# Patient Record
Sex: MALE | Race: OTHER | Employment: UNEMPLOYED | URBAN - METROPOLITAN AREA
[De-identification: names, ages, dates, MRNs, and addresses within clinical notes are randomized per-mention and may not be internally consistent; named-entity substitution may affect disease eponyms.]

---

## 2020-03-16 ENCOUNTER — ANESTHESIA EVENT (OUTPATIENT)
Dept: ENDOSCOPY | Age: 65
End: 2020-03-16
Payer: MEDICARE

## 2020-03-16 RX ORDER — SODIUM CHLORIDE 0.9 % (FLUSH) 0.9 %
5-40 SYRINGE (ML) INJECTION EVERY 8 HOURS
Status: CANCELLED | OUTPATIENT
Start: 2020-03-16

## 2020-03-16 RX ORDER — SODIUM CHLORIDE, SODIUM LACTATE, POTASSIUM CHLORIDE, CALCIUM CHLORIDE 600; 310; 30; 20 MG/100ML; MG/100ML; MG/100ML; MG/100ML
100 INJECTION, SOLUTION INTRAVENOUS CONTINUOUS
Status: CANCELLED | OUTPATIENT
Start: 2020-03-16

## 2020-03-16 RX ORDER — SODIUM CHLORIDE 0.9 % (FLUSH) 0.9 %
5-40 SYRINGE (ML) INJECTION AS NEEDED
Status: CANCELLED | OUTPATIENT
Start: 2020-03-16

## 2020-03-17 ENCOUNTER — HOSPITAL ENCOUNTER (OUTPATIENT)
Age: 65
Setting detail: OUTPATIENT SURGERY
Discharge: HOME | End: 2020-03-17
Attending: INTERNAL MEDICINE | Admitting: INTERNAL MEDICINE
Payer: MEDICARE

## 2020-03-17 ENCOUNTER — ANESTHESIA (OUTPATIENT)
Dept: ENDOSCOPY | Age: 65
End: 2020-03-17
Payer: MEDICARE

## 2020-03-17 VITALS
HEART RATE: 66 BPM | DIASTOLIC BLOOD PRESSURE: 75 MMHG | OXYGEN SATURATION: 98 % | SYSTOLIC BLOOD PRESSURE: 148 MMHG | TEMPERATURE: 98.1 F | RESPIRATION RATE: 16 BRPM

## 2020-03-17 PROCEDURE — 76060000031 HC ANESTHESIA FIRST 0.5 HR: Performed by: INTERNAL MEDICINE

## 2020-03-17 PROCEDURE — 74011250636 HC RX REV CODE- 250/636: Performed by: ANESTHESIOLOGY

## 2020-03-17 PROCEDURE — 74011250636 HC RX REV CODE- 250/636: Performed by: REGISTERED NURSE

## 2020-03-17 PROCEDURE — 76040000025: Performed by: INTERNAL MEDICINE

## 2020-03-17 RX ORDER — PROPOFOL 10 MG/ML
INJECTION, EMULSION INTRAVENOUS AS NEEDED
Status: DISCONTINUED | OUTPATIENT
Start: 2020-03-17 | End: 2020-03-17 | Stop reason: HOSPADM

## 2020-03-17 RX ORDER — SODIUM CHLORIDE, SODIUM LACTATE, POTASSIUM CHLORIDE, CALCIUM CHLORIDE 600; 310; 30; 20 MG/100ML; MG/100ML; MG/100ML; MG/100ML
INJECTION, SOLUTION INTRAVENOUS
Status: DISCONTINUED | OUTPATIENT
Start: 2020-03-17 | End: 2020-03-17 | Stop reason: HOSPADM

## 2020-03-17 RX ORDER — SODIUM CHLORIDE, SODIUM LACTATE, POTASSIUM CHLORIDE, CALCIUM CHLORIDE 600; 310; 30; 20 MG/100ML; MG/100ML; MG/100ML; MG/100ML
1000 INJECTION, SOLUTION INTRAVENOUS CONTINUOUS
Status: DISCONTINUED | OUTPATIENT
Start: 2020-03-17 | End: 2020-03-17 | Stop reason: HOSPADM

## 2020-03-17 RX ORDER — PROPOFOL 10 MG/ML
INJECTION, EMULSION INTRAVENOUS
Status: DISCONTINUED | OUTPATIENT
Start: 2020-03-17 | End: 2020-03-17 | Stop reason: HOSPADM

## 2020-03-17 RX ADMIN — PROPOFOL 120 MCG/KG/MIN: 10 INJECTION, EMULSION INTRAVENOUS at 12:05

## 2020-03-17 RX ADMIN — SODIUM CHLORIDE, SODIUM LACTATE, POTASSIUM CHLORIDE, AND CALCIUM CHLORIDE: 600; 310; 30; 20 INJECTION, SOLUTION INTRAVENOUS at 11:56

## 2020-03-17 RX ADMIN — SODIUM CHLORIDE, SODIUM LACTATE, POTASSIUM CHLORIDE, AND CALCIUM CHLORIDE 1000 ML: 600; 310; 30; 20 INJECTION, SOLUTION INTRAVENOUS at 11:12

## 2020-03-17 RX ADMIN — PROPOFOL 80 MG: 10 INJECTION, EMULSION INTRAVENOUS at 12:05

## 2020-03-17 NOTE — PROGRESS NOTES
present to cover any requests in Endoscopy. Thank you for this referral,      [place] , [facility]  /Patient [address]  [address] [facility], [facility]    [phone]

## 2020-03-17 NOTE — ANESTHESIA POSTPROCEDURE EVALUATION
Procedure(s):  COLONOSCOPY/ 29 .    total IV anesthesia    Anesthesia Post Evaluation      Multimodal analgesia: multimodal analgesia used between 6 hours prior to anesthesia start to PACU discharge  Patient location during evaluation: bedside  Patient participation: complete - patient participated  Level of consciousness: awake and alert  Pain score: 3  Pain management: adequate  Airway patency: patent  Anesthetic complications: no  Cardiovascular status: acceptable and hemodynamically stable  Respiratory status: acceptable  Hydration status: acceptable  Post anesthesia nausea and vomiting:  none      Vitals Value Taken Time   /75 3/17/2020 12:57 PM   Temp     Pulse 66 3/17/2020 12:56 PM   Resp 16 3/17/2020 12:56 PM   SpO2 98 % 3/17/2020 12:56 PM   Vitals shown include unvalidated device data.

## 2020-03-17 NOTE — ROUTINE PROCESS
Late entry  
 
 the pt was discharged via wheelchair by staff to pt's son's car driven by the son Irene Eugene, safety was maintained at all times, written and verbal discharge instructions were reviewed by MANAV RN with the pt and his son, written instructions taken by the pt's son. MD was at bedside to review findings with the pt and his son.

## 2020-03-17 NOTE — DISCHARGE INSTRUCTIONS
Gastrointestinal Colonoscopy/Flexible Sigmoidoscopy - Lower Exam Discharge Instructions  1. Call Dr. Pam Goetz at [phone] for any problems or questions. 2. Contact the doctors office for follow up appointment as directed  3. Medication may cause drowsiness for several hours, therefore, do not drive or operate machinery for remainder of the day. 4. Do not make any legal decisions today. 5. No alcohol today. 6. Ordinarily, you may resume regular diet and activity after exam unless otherwise specified by your physician. 7. Because of air put into your colon during exam, you may experience some abdominal distension, relieved by the passage of gas, for several hours. 8. Contact your physician if you have any of the following:  a. Excessive amount of bleeding - large amount when having a bowel movement. Occasional specks of blood with bowel movement would not be unusual.  b. Severe abdominal pain  c. Fever or Chills    Any additional instructions:   1. Repeat colonoscopy in 10 years unless problems occur before this time period  2. Start Miralax 3/4 cupful twice daily at night and in the morning  3. If constipation not relieved after a few weeks on Miralax, call office for follow up visit        Instructions given to [facility] and other family members.
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
## 2020-04-06 NOTE — OP NOTES
[address] [facility]  OPERATIVE REPORT    Name:  Michelle Horner  MR#:  [id number]  :  1955  ACCOUNT #:  [de-identified]  DATE OF SERVICE:  2020    REFERRING PHYSICIAN:  Julissa Mix DO    PREOPERATIVE DIAGNOSIS:  Screening    POSTOPERATIVE DIAGNOSIS:  Internal hemorrhoids. PROCEDURE PERFORMED:  Colonoscopy. Other procedures none. SURGEON:  Bonnie Bedolla MD    ASSISTANT:  There are no assistants. ANESTHESIA:  MAC.    COMPLICATIONS:  none. SPECIMENS REMOVED:  There is no specimen sent to the lab. IMPLANTS:  none. ESTIMATED BLOOD LOSS:  There is no blood loss. INSTRUMENT:  Olympus CF-[id number]. INDICATION:  Constipation, screening. PROCEDURE:  After informed consent was obtained, the patient was placed in the left lateral decubitus position in the endoscopy suite. Conscious sedation was obtained utilizing monitored anesthesia. Please see anesthesia flow sheet for details. Once adequate analgesia was obtained, digital rectal examination was performed which was normal.  An Olympus CF-[id number] video-chip colonoscope was advanced into the rectum to the cecum under direct visualization. Cecum was verified by the presence of the ileocecal valve in the appendiceal orifice. The instrument was withdrawn with careful attention to mucosal detail. The cecum, ascending, transverse, descending, and sigmoid colon are all normal.  In the rectum, there are uncomplicated internal hemorrhoids. There is no blood loss. There are no specimens sent to the lab. There are no assistants. ASSESSMENT AND PLAN:  Internal hemorrhoids. Normal screening colonoscopy. Repeat colonoscopy is due in 10 years.       MD JAMARCUS Armstrong/[email or web address]  D:  2020 11:11  T:  2020 14:28  JOB #:  [id number]

## 2024-11-02 ENCOUNTER — HOSPITAL ENCOUNTER (EMERGENCY)
Age: 69
Discharge: HOME | End: 2024-11-02

## 2024-11-02 ENCOUNTER — APPOINTMENT (OUTPATIENT)
Dept: GENERAL RADIOLOGY | Age: 69
End: 2024-11-02

## 2024-11-02 ENCOUNTER — APPOINTMENT (OUTPATIENT)
Dept: CT IMAGING | Age: 69
End: 2024-11-02

## 2024-11-02 VITALS
DIASTOLIC BLOOD PRESSURE: 68 MMHG | HEIGHT: 67 IN | SYSTOLIC BLOOD PRESSURE: 115 MMHG | BODY MASS INDEX: 40.81 KG/M2 | OXYGEN SATURATION: 97 % | TEMPERATURE: 97.4 F | RESPIRATION RATE: 16 BRPM | HEART RATE: 92 BPM | WEIGHT: 260 LBS
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
## 2024-11-02 DIAGNOSIS — M47.812 OSTEOARTHRITIS OF CERVICAL SPINE, UNSPECIFIED SPINAL OSTEOARTHRITIS COMPLICATION STATUS: ICD-10-CM

## 2024-11-02 DIAGNOSIS — M24.812 INTERNAL DERANGEMENT OF LEFT SHOULDER: Primary | ICD-10-CM

## 2024-11-02 DIAGNOSIS — M50.30 DDD (DEGENERATIVE DISC DISEASE), CERVICAL: ICD-10-CM

## 2024-11-02 PROBLEM — D50.9 IRON DEFICIENCY ANEMIA: Status: ACTIVE | Noted: 2018-08-23

## 2024-11-02 PROBLEM — K76.0 FATTY LIVER: Status: ACTIVE | Noted: 2022-04-19

## 2024-11-02 PROBLEM — M54.40 LUMBAGO WITH SCIATICA: Status: ACTIVE | Noted: 2024-11-02

## 2024-11-02 PROBLEM — I70.0 ATHEROSCLEROSIS OF AORTA (HCC): Status: ACTIVE | Noted: 2024-02-29

## 2024-11-02 PROBLEM — M54.16 LUMBAR RADICULOPATHY, CHRONIC: Status: ACTIVE | Noted: 2018-04-19

## 2024-11-02 PROBLEM — G89.29 CHRONIC PAIN OF RIGHT KNEE: Status: ACTIVE | Noted: 2022-01-11

## 2024-11-02 PROBLEM — G47.9 SLEEP DISORDER: Status: ACTIVE | Noted: 2024-11-02

## 2024-11-02 PROBLEM — G81.94 LEFT HEMIPARESIS (HCC): Status: ACTIVE | Noted: 2024-02-29

## 2024-11-02 PROBLEM — M51.369 DDD (DEGENERATIVE DISC DISEASE), LUMBAR: Status: ACTIVE | Noted: 2017-09-20

## 2024-11-02 PROBLEM — S32.010A COMPRESSION FRACTURE OF L1 LUMBAR VERTEBRA (HCC): Status: ACTIVE | Noted: 2022-04-19

## 2024-11-02 PROBLEM — M51.26 HNP (HERNIATED NUCLEUS PULPOSUS), LUMBAR: Status: ACTIVE | Noted: 2017-10-18

## 2024-11-02 PROBLEM — G47.33 OSA ON CPAP: Status: ACTIVE | Noted: 2017-06-07

## 2024-11-02 PROBLEM — R29.6 FREQUENT FALLS: Status: ACTIVE | Noted: 2024-02-29

## 2024-11-02 PROBLEM — B36.0 PITYRIASIS VERSICOLOR: Status: ACTIVE | Noted: 2024-11-02

## 2024-11-02 PROBLEM — R26.9 GAIT ABNORMALITY: Status: ACTIVE | Noted: 2024-02-29

## 2024-11-02 PROBLEM — R30.0 DYSURIA: Status: ACTIVE | Noted: 2022-08-03

## 2024-11-02 PROBLEM — M48.02 CERVICAL STENOSIS OF SPINE: Status: ACTIVE | Noted: 2024-10-08

## 2024-11-02 PROBLEM — F32.A DEPRESSIVE DISORDER: Status: ACTIVE | Noted: 2024-11-02

## 2024-11-02 PROBLEM — F33.1 MAJOR DEPRESSIVE DISORDER, RECURRENT, MODERATE (HCC): Status: ACTIVE | Noted: 2023-01-13

## 2024-11-02 PROBLEM — R41.3 MEMORY LOSS: Status: ACTIVE | Noted: 2022-12-20

## 2024-11-02 PROBLEM — G93.32 CHRONIC FATIGUE SYNDROME: Status: ACTIVE | Noted: 2019-03-06

## 2024-11-02 PROBLEM — Z98.890 H/O SHOULDER SURGERY: Status: ACTIVE | Noted: 2018-01-16

## 2024-11-02 PROBLEM — R79.89 DECREASED TESTOSTERONE LEVEL: Status: ACTIVE | Noted: 2024-11-02

## 2024-11-02 PROBLEM — R60.0 LOWER EXTREMITY EDEMA: Status: ACTIVE | Noted: 2022-01-11

## 2024-11-02 PROBLEM — G89.29 CHRONIC PAIN: Status: ACTIVE | Noted: 2024-11-02

## 2024-11-02 PROBLEM — M43.16 SPONDYLOLISTHESIS AT L4-L5 LEVEL: Status: ACTIVE | Noted: 2022-05-04

## 2024-11-02 PROBLEM — I10 ESSENTIAL HYPERTENSION: Status: ACTIVE | Noted: 2024-11-02

## 2024-11-02 PROBLEM — Z86.0100 HISTORY OF COLON POLYPS: Status: ACTIVE | Noted: 2022-04-14

## 2024-11-02 PROBLEM — K63.5 HYPERPLASTIC POLYP OF SIGMOID COLON: Status: ACTIVE | Noted: 2017-04-05

## 2024-11-02 PROBLEM — E34.9 TESTOSTERONE DEFICIENCY: Status: ACTIVE | Noted: 2017-07-17

## 2024-11-02 PROBLEM — J31.0 RHINITIS, CHRONIC: Status: ACTIVE | Noted: 2017-01-30

## 2024-11-02 PROBLEM — M79.10 MYALGIA: Status: ACTIVE | Noted: 2022-12-16

## 2024-11-02 PROBLEM — E78.5 HYPERLIPIDEMIA: Status: ACTIVE | Noted: 2024-11-02

## 2024-11-02 PROBLEM — M17.10 ARTHRITIS OF KNEE: Status: ACTIVE | Noted: 2024-11-02

## 2024-11-02 PROBLEM — I69.30 HISTORY OF STROKE WITH CURRENT RESIDUAL EFFECTS: Status: ACTIVE | Noted: 2024-02-29

## 2024-11-02 PROBLEM — E04.2 NONTOXIC MULTINODULAR GOITER: Status: ACTIVE | Noted: 2020-12-18

## 2024-11-02 PROBLEM — E29.1 TESTICULAR HYPOFUNCTION: Status: ACTIVE | Noted: 2024-11-02

## 2024-11-02 PROBLEM — E11.9 TYPE 2 DIABETES MELLITUS WITHOUT COMPLICATION, WITHOUT LONG-TERM CURRENT USE OF INSULIN (HCC): Status: ACTIVE | Noted: 2021-02-14

## 2024-11-02 PROBLEM — M25.561 CHRONIC PAIN OF RIGHT KNEE: Status: ACTIVE | Noted: 2022-01-11

## 2024-11-02 PROBLEM — F33.3 SEVERE EPISODE OF RECURRENT MAJOR DEPRESSIVE DISORDER, WITH PSYCHOTIC FEATURES (HCC): Status: ACTIVE | Noted: 2024-02-29

## 2024-11-02 PROBLEM — R14.0 ABDOMINAL DISTENSION, GASEOUS: Status: ACTIVE | Noted: 2024-11-02

## 2024-11-02 PROBLEM — M48.061 SPINAL STENOSIS AT L4-L5 LEVEL: Status: ACTIVE | Noted: 2017-09-20

## 2024-11-02 PROCEDURE — 73030 X-RAY EXAM OF SHOULDER: CPT

## 2024-11-02 PROCEDURE — 96372 THER/PROPH/DIAG INJ SC/IM: CPT

## 2024-11-02 PROCEDURE — 73060 X-RAY EXAM OF HUMERUS: CPT

## 2024-11-02 PROCEDURE — 6360000002 HC RX W HCPCS

## 2024-11-02 PROCEDURE — 6370000000 HC RX 637 (ALT 250 FOR IP)

## 2024-11-02 PROCEDURE — 72125 CT NECK SPINE W/O DYE: CPT

## 2024-11-02 PROCEDURE — 99284 EMERGENCY DEPT VISIT MOD MDM: CPT

## 2024-11-02 PROCEDURE — 70450 CT HEAD/BRAIN W/O DYE: CPT

## 2024-11-02 RX ORDER — LIDOCAINE 4 G/G
1 PATCH TOPICAL DAILY
Qty: 14 EACH | Refills: 0 | Status: SHIPPED | OUTPATIENT
Start: 2024-11-02 | End: 2024-11-16

## 2024-11-02 RX ORDER — ONDANSETRON 4 MG/1
4 TABLET, ORALLY DISINTEGRATING ORAL
Status: COMPLETED | OUTPATIENT
Start: 2024-11-02 | End: 2024-11-02

## 2024-11-02 RX ORDER — DEXAMETHASONE SODIUM PHOSPHATE 10 MG/ML
4 INJECTION INTRAMUSCULAR; INTRAVENOUS
Status: COMPLETED | OUTPATIENT
Start: 2024-11-02 | End: 2024-11-02

## 2024-11-02 RX ORDER — NAPROXEN 500 MG/1
500 TABLET ORAL 2 TIMES DAILY WITH MEALS
Qty: 28 TABLET | Refills: 0 | Status: SHIPPED | OUTPATIENT
Start: 2024-11-02 | End: 2024-11-16

## 2024-11-02 RX ORDER — LIDOCAINE 4 G/G
1 PATCH TOPICAL
Status: DISCONTINUED | OUTPATIENT
Start: 2024-11-02 | End: 2024-11-03 | Stop reason: HOSPADM

## 2024-11-02 RX ORDER — KETOROLAC TROMETHAMINE 15 MG/ML
15 INJECTION, SOLUTION INTRAMUSCULAR; INTRAVENOUS
Status: COMPLETED | OUTPATIENT
Start: 2024-11-02 | End: 2024-11-02

## 2024-11-02 RX ORDER — METHOCARBAMOL 500 MG/1
500 TABLET, FILM COATED ORAL 4 TIMES DAILY
Qty: 40 TABLET | Refills: 0 | Status: SHIPPED | OUTPATIENT
Start: 2024-11-02 | End: 2024-11-12

## 2024-11-02 RX ADMIN — DEXAMETHASONE SODIUM PHOSPHATE 4 MG: 10 INJECTION INTRAMUSCULAR; INTRAVENOUS at 22:16

## 2024-11-02 RX ADMIN — KETOROLAC TROMETHAMINE 15 MG: 15 INJECTION, SOLUTION INTRAMUSCULAR; INTRAVENOUS at 22:06

## 2024-11-02 RX ADMIN — ONDANSETRON 4 MG: 4 TABLET, ORALLY DISINTEGRATING ORAL at 19:53

## 2024-11-02 RX ADMIN — FENTANYL CITRATE 50 MCG: 50 INJECTION INTRAMUSCULAR; INTRAVENOUS at 19:53

## 2024-11-02 ASSESSMENT — PAIN SCALES - GENERAL
PAINLEVEL_OUTOF10: 6
PAINLEVEL_OUTOF10: 6
PAINLEVEL_OUTOF10: 7

## 2024-11-02 ASSESSMENT — LIFESTYLE VARIABLES
HOW MANY STANDARD DRINKS CONTAINING ALCOHOL DO YOU HAVE ON A TYPICAL DAY: PATIENT DOES NOT DRINK
HOW OFTEN DO YOU HAVE A DRINK CONTAINING ALCOHOL: NEVER

## 2024-11-02 NOTE — ED PROVIDER NOTES
Emergency Department Provider Note       PCP: No, Pcp   Age: 69 y.o.   Sex: male     DISPOSITION Decision To Discharge 11/02/2024 10:14:44 PM    ICD-10-CM    1. Internal derangement of left shoulder  M24.812 naproxen (NAPROSYN) 500 MG tablet     methocarbamol (ROBAXIN) 500 MG tablet     lidocaine 4 % external patch     [facility]      2. Fall, initial encounter  W19.XXXA naproxen (NAPROSYN) 500 MG tablet     methocarbamol (ROBAXIN) 500 MG tablet      3. Osteoarthritis of cervical spine, unspecified spinal osteoarthritis complication status  M47.812 naproxen (NAPROSYN) 500 MG tablet      4. DDD (degenerative disc disease), cervical  M50.30           Medical Decision Making     Vital signs reviewed, patient stable, NAD, afebrile, nontoxic in appearance     69-year-old male with history of CVA and left upper and left lower extremity deficits presents to the ER today with complaint of 5 days of worsening left upper arm pain after he tripped and fell landing on his left side.  He has been taking his 10 mg hydrocodone's which he normally takes for his chronic pain and this is not helping.  Denies headache or dizziness.  Denies changes to vision or speech.  Walks with a cane drove himself to the ER.  He states that normally he can open his left hand but now he cannot completely.  He states that his sensation is intact after stroke.    On exam 2+ radial pulse.  Sensation is intact in left upper extremity.  He does have tenderness to proximal and mid left humerus.  No tenderness along midline C-spine T-spine or L-spine.    Last took hydrocodone 3 hours ago.    ODT Trisha  Will administer IM fentanyl.  He states that he can call somebody to pick him up  Will obtain a CT of his head  X-ray of left humerus    He is in agreement with this plan     was used during evaluation    X-ray of left humerus without acute bony abnormality, fracture or dislocation.  Glenohumeral joint not well

## 2024-11-03 NOTE — ED NOTES
Patient mobility status  with mild difficulty. Provider aware     I have reviewed discharge instructions with the patient.  The patient verbalized understanding.    Patient left ED via Discharge Method: ambulatory to Home with  self .    Opportunity for questions and clarification provided.     Patient given 1 scripts.

## 2024-11-03 NOTE — DISCHARGE INSTRUCTIONS
You were seen in the emergency department today for severe left shoulder pain after fall.    X-rays today are negative for any fracture or dislocation  And no evidence of a bleed on the brain  You do have significant degenerative changes in your neck consistent with your history.    You were given IV narcotics today.  Somebody has to come and pick you up.  You cannot drive home as we discussed    I gave you a shot of some steroid here today as well as an anti-inflammatory called Toradol and a lidocaine patch.    I have written you for 4% lidocaine patch that you can wear for 12 hours and then go 12 hours without wearing.    I have written you for Naprosyn anti-inflammatory that you are to take twice daily with food.    I have written you for a a muscle relaxer called Robaxin.  This might make you groggy, foggy, sleepy.  Do not take this at the same time that you take your narcotic pain medication.  Do not drink alcohol on this medication.    Orthopedics will contact you to establish follow-up care.  If you do not hear from them by Tuesday I recommend that you call them.  You need to have a follow-up with Dr. Castle    Call your primary care doctor and your neurosurgery doctor on Monday to schedule an ER follow-up after your fall.    No indication for narcotics as you are already prescribed narcotics.    I have written a prescription for Naprosyn.  This is an NSAID.  Take this medication twice daily with food.  Do not take this medication with other NSAIDs such as ibuprofen, Motrin, Mobic, Aleve, diclofenac.    Recommend that you ice your shoulder in 20-minute increments.    Return to emergency department for chest pain, shortness of breath, signs or symptoms of stroke as this your discharge paperwork    Usted fue atendido hoy en el departamento de emergencias por un dolor intenso en el hombro milly después de yumiko caída.    Las radiografías hoy son negativas para cualquier fractura o luxación.  Y no hay evidencia

## 2024-11-04 ENCOUNTER — TELEPHONE (OUTPATIENT)
Dept: ORTHOPEDIC SURGERY | Age: 69
End: 2024-11-04

## 2024-11-04 NOTE — TELEPHONE ENCOUNTER
Urgent ED referral  request AGP    Internal derangement of left shoulder   Please review and advise  Thank you      This note came across with no pt attached.  It may belong to him  Hung Wilburn PA P [facility] Appointments  This patient came in the ER on Saturday and needs an appointment with Dr. Castle for his left shoulder.  Has a history of shoulder replacement by Dr. Castle 10 years ago.

## 2024-11-05 NOTE — TELEPHONE ENCOUNTER
Attempted to call every number in his chart and had to [facility] asking him to return call to schedule.

## 2025-03-02 ENCOUNTER — HOSPITAL ENCOUNTER (EMERGENCY)
Age: 70
Discharge: HOME | End: 2025-03-03
Attending: GENERAL PRACTICE
Payer: MEDICARE

## 2025-03-02 ENCOUNTER — APPOINTMENT (OUTPATIENT)
Dept: CT IMAGING | Age: 70
End: 2025-03-02
Payer: MEDICARE

## 2025-03-02 DIAGNOSIS — L03.319 CELLULITIS AND ABSCESS OF TRUNK: Primary | ICD-10-CM

## 2025-03-02 DIAGNOSIS — L02.219 CELLULITIS AND ABSCESS OF TRUNK: Primary | ICD-10-CM

## 2025-03-02 PROCEDURE — 85652 RBC SED RATE AUTOMATED: CPT

## 2025-03-02 PROCEDURE — 85025 COMPLETE CBC W/AUTO DIFF WBC: CPT

## 2025-03-02 PROCEDURE — 96374 THER/PROPH/DIAG INJ IV PUSH: CPT

## 2025-03-02 PROCEDURE — 99285 EMERGENCY DEPT VISIT HI MDM: CPT

## 2025-03-02 PROCEDURE — 80053 COMPREHEN METABOLIC PANEL: CPT

## 2025-03-02 PROCEDURE — 96375 TX/PRO/DX INJ NEW DRUG ADDON: CPT

## 2025-03-02 PROCEDURE — 6360000002 HC RX W HCPCS: Performed by: GENERAL PRACTICE

## 2025-03-02 PROCEDURE — 86141 C-REACTIVE PROTEIN HS: CPT

## 2025-03-02 RX ORDER — IOPAMIDOL 755 MG/ML
100 INJECTION, SOLUTION INTRAVASCULAR
Status: COMPLETED | OUTPATIENT
Start: 2025-03-02 | End: 2025-03-03

## 2025-03-02 RX ORDER — ONDANSETRON 2 MG/ML
4 INJECTION INTRAMUSCULAR; INTRAVENOUS ONCE
Status: COMPLETED | OUTPATIENT
Start: 2025-03-02 | End: 2025-03-02

## 2025-03-02 RX ORDER — MORPHINE SULFATE 4 MG/ML
4 INJECTION, SOLUTION INTRAMUSCULAR; INTRAVENOUS
Status: COMPLETED | OUTPATIENT
Start: 2025-03-02 | End: 2025-03-02

## 2025-03-02 RX ADMIN — ONDANSETRON 4 MG: 2 INJECTION, SOLUTION INTRAMUSCULAR; INTRAVENOUS at 23:35

## 2025-03-02 RX ADMIN — MORPHINE SULFATE 4 MG: 4 INJECTION INTRAVENOUS at 23:35

## 2025-03-02 ASSESSMENT — PAIN SCALES - GENERAL
PAINLEVEL_OUTOF10: 9
PAINLEVEL_OUTOF10: 9

## 2025-03-02 ASSESSMENT — PAIN DESCRIPTION - ORIENTATION: ORIENTATION: LEFT

## 2025-03-02 ASSESSMENT — PAIN DESCRIPTION - LOCATION: LOCATION: SHOULDER

## 2025-03-02 ASSESSMENT — PAIN - FUNCTIONAL ASSESSMENT: PAIN_FUNCTIONAL_ASSESSMENT: 0-10

## 2025-03-03 ENCOUNTER — APPOINTMENT (OUTPATIENT)
Dept: CT IMAGING | Age: 70
End: 2025-03-03
Payer: MEDICARE

## 2025-03-03 ENCOUNTER — OFFICE VISIT (OUTPATIENT)
Dept: ORTHOPEDIC SURGERY | Age: 70
End: 2025-03-03
Payer: MEDICARE

## 2025-03-03 VITALS
WEIGHT: 178 LBS | TEMPERATURE: 97.7 F | RESPIRATION RATE: 16 BRPM | DIASTOLIC BLOOD PRESSURE: 90 MMHG | SYSTOLIC BLOOD PRESSURE: 147 MMHG | OXYGEN SATURATION: 97 % | HEIGHT: 66 IN | HEART RATE: 88 BPM | BODY MASS INDEX: 28.61 KG/M2

## 2025-03-03 VITALS — BODY MASS INDEX: 27.32 KG/M2 | HEIGHT: 66 IN | WEIGHT: 170 LBS

## 2025-03-03 DIAGNOSIS — Z96.612 AFTERCARE FOLLOWING LEFT SHOULDER JOINT REPLACEMENT SURGERY: ICD-10-CM

## 2025-03-03 DIAGNOSIS — Z96.619 INFECTION OF PROSTHETIC SHOULDER JOINT, INITIAL ENCOUNTER: Primary | ICD-10-CM

## 2025-03-03 DIAGNOSIS — Z47.1 AFTERCARE FOLLOWING LEFT SHOULDER JOINT REPLACEMENT SURGERY: ICD-10-CM

## 2025-03-03 DIAGNOSIS — T84.59XA INFECTION OF PROSTHETIC SHOULDER JOINT, INITIAL ENCOUNTER: Primary | ICD-10-CM

## 2025-03-03 DIAGNOSIS — Z96.612 PRESENCE OF LEFT ARTIFICIAL SHOULDER JOINT: ICD-10-CM

## 2025-03-03 LAB
ALBUMIN SERPL-MCNC: 3.3 G/DL (ref 3.2–4.6)
ALBUMIN/GLOB SERPL: 0.9 (ref 1–1.9)
ALP SERPL-CCNC: 83 U/L (ref 40–129)
ALT SERPL-CCNC: 9 U/L (ref 8–55)
ANION GAP SERPL CALC-SCNC: 10 MMOL/L (ref 7–16)
AST SERPL-CCNC: 11 U/L (ref 15–37)
BASOPHILS # BLD: 0.06 K/UL (ref 0–0.2)
BASOPHILS NFR BLD: 0.7 % (ref 0–2)
BILIRUB SERPL-MCNC: <0.2 MG/DL (ref 0–1.2)
BUN SERPL-MCNC: 26 MG/DL (ref 8–23)
CALCIUM SERPL-MCNC: 9.6 MG/DL (ref 8.8–10.2)
CHLORIDE SERPL-SCNC: 99 MMOL/L (ref 98–107)
CO2 SERPL-SCNC: 28 MMOL/L (ref 20–29)
CREAT SERPL-MCNC: 1.04 MG/DL (ref 0.8–1.3)
CRP SERPL HS-MCNC: 80.6 MG/L (ref 0–3)
DIFFERENTIAL METHOD BLD: ABNORMAL
EOSINOPHIL # BLD: 0.22 K/UL (ref 0–0.8)
EOSINOPHIL NFR BLD: 2.6 % (ref 0.5–7.8)
ERYTHROCYTE [DISTWIDTH] IN BLOOD BY AUTOMATED COUNT: 12.1 % (ref 11.9–14.6)
ERYTHROCYTE [SEDIMENTATION RATE] IN BLOOD: 15 MM/HR
GLOBULIN SER CALC-MCNC: 3.6 G/DL (ref 2.3–3.5)
GLUCOSE SERPL-MCNC: 200 MG/DL (ref 70–99)
HCT VFR BLD AUTO: 35.1 % (ref 41.1–50.3)
HGB BLD-MCNC: 11.4 G/DL (ref 13.6–17.2)
IMM GRANULOCYTES # BLD AUTO: 0.05 K/UL (ref 0–0.5)
IMM GRANULOCYTES NFR BLD AUTO: 0.6 % (ref 0–5)
LYMPHOCYTES # BLD: 1.98 K/UL (ref 0.5–4.6)
LYMPHOCYTES NFR BLD: 23.1 % (ref 13–44)
MCH RBC QN AUTO: 28.2 PG (ref 26.1–32.9)
MCHC RBC AUTO-ENTMCNC: 32.5 G/DL (ref 31.4–35)
MCV RBC AUTO: 86.9 FL (ref 82–102)
MONOCYTES # BLD: 0.77 K/UL (ref 0.1–1.3)
MONOCYTES NFR BLD: 9 % (ref 4–12)
NEUTS SEG # BLD: 5.51 K/UL (ref 1.7–8.2)
NEUTS SEG NFR BLD: 64 % (ref 43–78)
NRBC # BLD: 0 K/UL (ref 0–0.2)
PLATELET # BLD AUTO: 293 K/UL (ref 150–450)
PMV BLD AUTO: 9.4 FL (ref 9.4–12.3)
POTASSIUM SERPL-SCNC: 4.5 MMOL/L (ref 3.5–5.1)
PROT SERPL-MCNC: 7 G/DL (ref 6.3–8.2)
RBC # BLD AUTO: 4.04 M/UL (ref 4.23–5.6)
SODIUM SERPL-SCNC: 137 MMOL/L (ref 136–145)
WBC # BLD AUTO: 8.6 K/UL (ref 4.3–11.1)

## 2025-03-03 PROCEDURE — G8419 CALC BMI OUT NRM PARAM NOF/U: HCPCS | Performed by: ORTHOPAEDIC SURGERY

## 2025-03-03 PROCEDURE — 96376 TX/PRO/DX INJ SAME DRUG ADON: CPT

## 2025-03-03 PROCEDURE — 73201 CT UPPER EXTREMITY W/DYE: CPT

## 2025-03-03 PROCEDURE — G8427 DOCREV CUR MEDS BY ELIG CLIN: HCPCS | Performed by: ORTHOPAEDIC SURGERY

## 2025-03-03 PROCEDURE — 3017F COLORECTAL CA SCREEN DOC REV: CPT | Performed by: ORTHOPAEDIC SURGERY

## 2025-03-03 PROCEDURE — 6360000004 HC RX CONTRAST MEDICATION: Performed by: GENERAL PRACTICE

## 2025-03-03 PROCEDURE — 1123F ACP DISCUSS/DSCN MKR DOCD: CPT | Performed by: ORTHOPAEDIC SURGERY

## 2025-03-03 PROCEDURE — 6360000002 HC RX W HCPCS: Performed by: GENERAL PRACTICE

## 2025-03-03 PROCEDURE — 99205 OFFICE O/P NEW HI 60 MIN: CPT | Performed by: ORTHOPAEDIC SURGERY

## 2025-03-03 PROCEDURE — 1036F TOBACCO NON-USER: CPT | Performed by: ORTHOPAEDIC SURGERY

## 2025-03-03 RX ORDER — SPIRONOLACTONE 50 MG/1
50 TABLET, FILM COATED ORAL DAILY
Status: ON HOLD | COMMUNITY
Start: 2024-05-17

## 2025-03-03 RX ORDER — LISINOPRIL 40 MG/1
40 TABLET ORAL DAILY
Status: ON HOLD | COMMUNITY
Start: 2024-08-20

## 2025-03-03 RX ORDER — LOSARTAN POTASSIUM AND HYDROCHLOROTHIAZIDE 25; 100 MG/1; MG/1
TABLET ORAL
Status: ON HOLD | COMMUNITY

## 2025-03-03 RX ORDER — DULOXETIN HYDROCHLORIDE 30 MG/1
CAPSULE, DELAYED RELEASE ORAL DAILY
Status: ON HOLD | COMMUNITY
Start: 2024-05-17

## 2025-03-03 RX ORDER — QUETIAPINE FUMARATE 25 MG/1
25 TABLET, FILM COATED ORAL
Status: ON HOLD | COMMUNITY
Start: 2024-05-17

## 2025-03-03 RX ORDER — MORPHINE SULFATE 4 MG/ML
4 INJECTION, SOLUTION INTRAMUSCULAR; INTRAVENOUS
Status: COMPLETED | OUTPATIENT
Start: 2025-03-03 | End: 2025-03-03

## 2025-03-03 RX ADMIN — IOPAMIDOL 100 ML: 755 INJECTION, SOLUTION INTRAVENOUS at 00:22

## 2025-03-03 RX ADMIN — MORPHINE SULFATE 4 MG: 4 INJECTION INTRAVENOUS at 04:55

## 2025-03-03 ASSESSMENT — PAIN SCALES - GENERAL: PAINLEVEL_OUTOF10: 8

## 2025-03-03 NOTE — ED TRIAGE NOTES
Pt ambulated to triage    Pt states he has an abscess on his left shoulder.  He has had this for months.  The site turned purple and then burst two days ago.  Pt has been seen by his pcp for this and is scheduled for an MRI tomorrow.  Site is also painful.  No fever, dizziness, or fatigue.      Sites is the size of a half dollar and is expelling a white pus.

## 2025-03-03 NOTE — PROGRESS NOTES
sign  negative hornblower's sign  No posterior glenohumeral joint line tenderness.   No evident excessive external rotation  Rotator cuff strength is 5/5.  negative external rotation stress test.   Negative empty can sign  There is no evident anterior or posterior apprehension with a negative sulcus sign.   No instability  negative external and internal Rotation lag sign  Neurovascularly intact.    The left shoulder has a healed deltopectoral incision  Medial to the incision there is a large area of erythema with purulent drainage.  Exam is limited due to pain.  He appears neurovascularly intact      Data Reviewed:        XR: AP, Y and axillary views left shoulder   Clinical Indication    ICD-10-CM    1. Infection of prosthetic shoulder joint, initial encounter  T84.59XA     Z96.619       2. Presence of left artificial shoulder joint  Z96.612 XR SHOULDER LEFT (MIN 2 VIEWS)      3. Aftercare following left shoulder joint replacement surgery  Z47.1 XR SHOULDER LEFT (MIN 2 VIEWS)    Z96.612          Report: AP Y axillary views left shoulder  were compared to previous films from November 2, 2024 which demonstrates loosening of the glenoid component.  The humeral component is cemented and remains in place    Impression:  as above   JOSUE DELUCA JR, MD         CT scan left shoulder dated 3/2/2025 demonstrates a fluid collection concerning for periprosthetic infection.    Impression:   1. Infection of prosthetic shoulder joint, initial encounter    2. Presence of left artificial shoulder joint    3. 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  Status post reverse left total shoulder arthroplasty with a delta xtend prosthesis biceps tenodesis 10/11/2013  Cervical spondylosis at multiple levels  Right-sided CVA with left-sided hemiparesis  Post stroke deformity left upper

## 2025-03-03 NOTE — ED PROVIDER NOTES
c-pap; does not use        Past Surgical History:   Procedure Laterality Date    COLONOSCOPY N/A 3/17/2020    COLONOSCOPY/ 28  performed by Jack Buenrostro MD at [facility] ENDOSCOPY    KNEE ARTHROSCOPY      left knee    ORTHOPEDIC SURGERY  2010    back    ORTHOPEDIC SURGERY      left clavicle        Social History     Socioeconomic History    Marital status:    Tobacco Use    Smoking status: Former     Current packs/day: 0.00     Types: Cigarettes     Quit date: 10/4/2000     Years since quittin.4    Smokeless tobacco: Never   Substance and Sexual Activity    Alcohol use: Yes    Drug use: No     Social Determinants of Health     Physical Activity: Sufficiently Active (2022)    Received from [facility]    Physical Activity     Days of Exercise per Week: 5 days     Minutes of Exercise per Session: 30     Total Minutes of Exercise per Week: 150   Social Connections: Unknown (3/19/2021)    Received from [facility]    Social Connections     Frequency of Communication with Friends and Family: Not asked     Frequency of Social Gatherings with Friends and Family: Not asked   Intimate Partner Violence: Unknown (3/19/2021)    Received from [facility]    Intimate Partner Violence     Fear of Current or Ex-Partner: Not asked     Emotionally Abused: Not asked     Physically Abused: Not asked     Sexually Abused: Not asked   Housing Stability: Not At Risk (3/9/2022)    Received from [facility]    Housing Stability     Was there a time when you did not have a steady place to sleep: Unrecognized value     Worried that the place you are staying is making you sick: Unrecognized value        Previous Medications    HYDROCODONE-ACETAMINOPHEN (NORCO)  MG PER TABLET    Take 1 tablet by mouth every 6 hours as needed.    NAPROXEN (NAPROSYN) 500 MG TABLET    Take 1 tablet by mouth 2 times daily (with meals) for 14 days    OMEPRAZOLE (PRILOSEC) 20 MG DELAYED RELEASE CAPSULE    Take 20 mg by
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
## 2025-03-04 NOTE — H&P
Subjective:     Patient is a 69 y.o. RHD MALE WITH LEFT SHOULDER PAIN.    SEE OFFICE NOTE.    Patient Active Problem List    Diagnosis Date Noted    Prosthetic shoulder infection 03/03/2025    Presence of left artificial shoulder joint 03/03/2025    Abdominal distension, gaseous 11/02/2024    Arthritis of knee 11/02/2024    Chronic pain 11/02/2024    Decreased testosterone level 11/02/2024    Depressive disorder 11/02/2024    Hyperlipidemia 11/02/2024    Lumbago with sciatica 11/02/2024    Pityriasis versicolor 11/02/2024    Sleep disorder 11/02/2024    Testicular hypofunction 11/02/2024    Essential hypertension 11/02/2024    Cervical stenosis of spine 10/08/2024    Atherosclerosis of aorta 02/29/2024    Frequent falls 02/29/2024    Gait abnormality 02/29/2024    History of stroke with current residual effects 02/29/2024    Left hemiparesis (HCC) 02/29/2024    Severe episode of recurrent major depressive disorder, with psychotic features (HCC) 02/29/2024    Major depressive disorder, recurrent, moderate (HCC) 01/13/2023    Memory loss 12/20/2022    Myalgia 12/16/2022    Dysuria 08/03/2022    Spondylolisthesis at L4-L5 level 05/04/2022    Compression fracture of L1 lumbar vertebra (HCC) 04/19/2022    Fatty liver 04/19/2022    History of colon polyps 04/14/2022    Chronic pain of right knee 01/11/2022    Lower extremity edema 01/11/2022    Type 2 diabetes mellitus without complication, without long-term current use of insulin (HCC) 02/14/2021    Nontoxic multinodular goiter 12/18/2020    Chronic fatigue syndrome 03/06/2019    Iron deficiency anemia 08/23/2018    Lumbar radiculopathy, chronic 04/19/2018    H/O shoulder surgery 01/16/2018    HNP (herniated nucleus pulposus), lumbar 10/18/2017    DDD (degenerative disc disease), lumbar 09/20/2017    Spinal stenosis at L4-L5 level 09/20/2017    Testosterone deficiency 07/17/2017    MORRIS on CPAP 06/07/2017    Hyperplastic polyp of sigmoid colon 04/05/2017

## 2025-03-04 NOTE — DISCHARGE SUMMARY
[facility] Discharge Summary      Patient ID:  Zack Treviño  [id number]  69 y.o.  1955    Allergies: Amlodipine, Atorvastatin, Carvedilol, Duloxetine, Niacin, and Pravastatin    Admit date: 3/6/2025    Discharge date and time: 3/11/2025    Admitting Physician: Rajinder Castle Jr., MD     Discharge Physician: RAJINDER CASTLE JR, MD      * Admission Diagnoses: Prosthetic shoulder infection [T84.59XA, Z96.619]  Presence of left artificial shoulder joint [Z96.612]  Prosthetic shoulder infection, initial encounter [T84.59XA, Z96.619]  Infection of prosthetic total shoulder joint, initial encounter [T84.59XA, Z96.619]    * Discharge Diagnoses: Principal Problem:    Prosthetic shoulder infection, initial encounter  Active Problems:    Prosthetic shoulder infection    Presence of left artificial shoulder joint    Infection of prosthetic total shoulder joint, initial encounter  Resolved Problems:    * No resolved hospital problems. *      Surgeon: RAJINDER CASTLE JR, MD          * Procedure: Procedure(s) with comments:  left shoulder irrigation and debridement, removal of left reverse total shoulder arthroplasty delta xtend prosthesis, insertion of Remedy spacer. general/interscalene. 23hr. - interscalene           Perioperative Antibiotics: Ancef  _X__                                                Vancomycin  _X__           Post Op complications: none      * Discharge Condition: Good  Wound appears to be healing without any evidence of infection.         * Discharged to: SNF    * Follow-up Care/Discharge instructions:  - Resume pre hospital diet            - Resume home medications per medical continuation form     CONTINUE PHYSICAL THERAPY  SLING LEFT SHOULDER  - Follow up in office as scheduled     THE PATIENT DOES NOT DEMONSTRATE SIGNS OF ACTIVE TUBERCULOSIS.    A PRESCRIPTION FOR DILAUDID 2 MG TABLETS PROVIDED.      Signed:  RAJINDER CASTLE JR, MD  3/6/2025  4:14 PM

## 2025-03-05 ENCOUNTER — PREP FOR PROCEDURE (OUTPATIENT)
Dept: ORTHOPEDIC SURGERY | Age: 70
End: 2025-03-05

## 2025-03-05 ENCOUNTER — ANESTHESIA EVENT (OUTPATIENT)
Dept: SURGERY | Age: 70
End: 2025-03-05
Payer: MEDICARE

## 2025-03-05 DIAGNOSIS — Z96.619 INFECTION OF PROSTHETIC SHOULDER JOINT, INITIAL ENCOUNTER: Primary | ICD-10-CM

## 2025-03-05 DIAGNOSIS — T84.59XA INFECTION OF PROSTHETIC SHOULDER JOINT, INITIAL ENCOUNTER: Primary | ICD-10-CM

## 2025-03-05 RX ORDER — PROMETHAZINE HYDROCHLORIDE 25 MG/1
25 TABLET ORAL EVERY 6 HOURS PRN
Qty: 20 TABLET | Refills: 0 | Status: ON HOLD | OUTPATIENT
Start: 2025-03-05

## 2025-03-05 RX ORDER — SODIUM CHLORIDE 0.9 % (FLUSH) 0.9 %
5-40 SYRINGE (ML) INJECTION PRN
Status: CANCELLED | OUTPATIENT
Start: 2025-03-05

## 2025-03-05 RX ORDER — HYDROMORPHONE HYDROCHLORIDE 2 MG/1
2 TABLET ORAL EVERY 6 HOURS PRN
Qty: 28 TABLET | Refills: 0 | Status: ON HOLD | OUTPATIENT
Start: 2025-03-05 | End: 2025-03-12

## 2025-03-05 RX ORDER — SODIUM CHLORIDE 0.9 % (FLUSH) 0.9 %
5-40 SYRINGE (ML) INJECTION EVERY 12 HOURS SCHEDULED
Status: CANCELLED | OUTPATIENT
Start: 2025-03-05

## 2025-03-05 RX ORDER — SODIUM CHLORIDE 9 MG/ML
INJECTION, SOLUTION INTRAVENOUS PRN
Status: CANCELLED | OUTPATIENT
Start: 2025-03-05

## 2025-03-06 ENCOUNTER — HOSPITAL ENCOUNTER (INPATIENT)
Age: 70
LOS: 5 days | Discharge: NURSING, REHAB OR LONG-TERM CARE | DRG: 483 | End: 2025-03-11
Attending: ORTHOPAEDIC SURGERY | Admitting: ORTHOPAEDIC SURGERY
Payer: MEDICARE

## 2025-03-06 ENCOUNTER — APPOINTMENT (OUTPATIENT)
Dept: GENERAL RADIOLOGY | Age: 70
DRG: 483 | End: 2025-03-06
Attending: ORTHOPAEDIC SURGERY
Payer: MEDICARE

## 2025-03-06 ENCOUNTER — ANESTHESIA (OUTPATIENT)
Dept: SURGERY | Age: 70
End: 2025-03-06
Payer: MEDICARE

## 2025-03-06 DIAGNOSIS — T84.59XA PROSTHETIC SHOULDER INFECTION: ICD-10-CM

## 2025-03-06 DIAGNOSIS — Z96.612 PRESENCE OF LEFT ARTIFICIAL SHOULDER JOINT: ICD-10-CM

## 2025-03-06 DIAGNOSIS — T84.59XA INFECTION OF PROSTHETIC SHOULDER JOINT, INITIAL ENCOUNTER: ICD-10-CM

## 2025-03-06 DIAGNOSIS — Z96.619 INFECTION OF PROSTHETIC SHOULDER JOINT, INITIAL ENCOUNTER: ICD-10-CM

## 2025-03-06 DIAGNOSIS — Z96.619 PROSTHETIC SHOULDER INFECTION: ICD-10-CM

## 2025-03-06 LAB
25(OH)D3 SERPL-MCNC: 18.1 NG/ML (ref 30–100)
ABO + RH BLD: NORMAL
ANION GAP SERPL CALC-SCNC: 14 MMOL/L (ref 7–16)
BASOPHILS # BLD: 0.06 K/UL (ref 0–0.2)
BASOPHILS NFR BLD: 0.4 % (ref 0–2)
BLOOD GROUP ANTIBODIES SERPL: NORMAL
BUN SERPL-MCNC: 14 MG/DL (ref 8–23)
CALCIUM SERPL-MCNC: 9 MG/DL (ref 8.8–10.2)
CHLORIDE SERPL-SCNC: 100 MMOL/L (ref 98–107)
CO2 SERPL-SCNC: 24 MMOL/L (ref 20–29)
CREAT SERPL-MCNC: 0.74 MG/DL (ref 0.8–1.3)
CRP SERPL-MCNC: 12.1 MG/DL (ref 0–0.4)
DIFFERENTIAL METHOD BLD: ABNORMAL
EOSINOPHIL # BLD: 0.02 K/UL (ref 0–0.8)
EOSINOPHIL NFR BLD: 0.1 % (ref 0.5–7.8)
ERYTHROCYTE [DISTWIDTH] IN BLOOD BY AUTOMATED COUNT: 11.9 % (ref 11.9–14.6)
ERYTHROCYTE [SEDIMENTATION RATE] IN BLOOD: 52 MM/HR
EST. AVERAGE GLUCOSE BLD GHB EST-MCNC: 169 MG/DL
GLUCOSE BLD STRIP.AUTO-MCNC: 203 MG/DL (ref 65–100)
GLUCOSE SERPL-MCNC: 228 MG/DL (ref 70–99)
HBA1C MFR BLD: 7.5 % (ref 0–5.6)
HCT VFR BLD AUTO: 33.9 % (ref 41.1–50.3)
HGB BLD-MCNC: 10.8 G/DL (ref 13.6–17.2)
IMM GRANULOCYTES # BLD AUTO: 0.09 K/UL (ref 0–0.5)
IMM GRANULOCYTES NFR BLD AUTO: 0.6 % (ref 0–5)
LYMPHOCYTES # BLD: 0.97 K/UL (ref 0.5–4.6)
LYMPHOCYTES NFR BLD: 7 % (ref 13–44)
MAGNESIUM SERPL-MCNC: 1.5 MG/DL (ref 1.8–2.4)
MCH RBC QN AUTO: 27.9 PG (ref 26.1–32.9)
MCHC RBC AUTO-ENTMCNC: 31.9 G/DL (ref 31.4–35)
MCV RBC AUTO: 87.6 FL (ref 82–102)
MONOCYTES # BLD: 0.34 K/UL (ref 0.1–1.3)
MONOCYTES NFR BLD: 2.5 % (ref 4–12)
NEUTS SEG # BLD: 12.38 K/UL (ref 1.7–8.2)
NEUTS SEG NFR BLD: 89.4 % (ref 43–78)
NRBC # BLD: 0 K/UL (ref 0–0.2)
PLATELET # BLD AUTO: 301 K/UL (ref 150–450)
PMV BLD AUTO: 9.4 FL (ref 9.4–12.3)
POTASSIUM SERPL-SCNC: 4.6 MMOL/L (ref 3.5–5.1)
RBC # BLD AUTO: 3.87 M/UL (ref 4.23–5.6)
SERVICE CMNT-IMP: ABNORMAL
SODIUM SERPL-SCNC: 138 MMOL/L (ref 136–145)
SPECIMEN EXP DATE BLD: NORMAL
WBC # BLD AUTO: 13.9 K/UL (ref 4.3–11.1)

## 2025-03-06 PROCEDURE — 6360000002 HC RX W HCPCS: Performed by: ANESTHESIOLOGY

## 2025-03-06 PROCEDURE — 2580000003 HC RX 258: Performed by: NURSE ANESTHETIST, CERTIFIED REGISTERED

## 2025-03-06 PROCEDURE — 87185 SC STD ENZYME DETCJ PER NZM: CPT

## 2025-03-06 PROCEDURE — 64415 NJX AA&/STRD BRCH PLXS IMG: CPT | Performed by: ANESTHESIOLOGY

## 2025-03-06 PROCEDURE — 2500000003 HC RX 250 WO HCPCS: Performed by: NURSE ANESTHETIST, CERTIFIED REGISTERED

## 2025-03-06 PROCEDURE — 83036 HEMOGLOBIN GLYCOSYLATED A1C: CPT

## 2025-03-06 PROCEDURE — 3E0T3BZ INTRODUCTION OF ANESTHETIC AGENT INTO PERIPHERAL NERVES AND PLEXI, PERCUTANEOUS APPROACH: ICD-10-PCS | Performed by: ANESTHESIOLOGY

## 2025-03-06 PROCEDURE — 6360000002 HC RX W HCPCS: Performed by: ORTHOPAEDIC SURGERY

## 2025-03-06 PROCEDURE — 2580000003 HC RX 258: Performed by: ORTHOPAEDIC SURGERY

## 2025-03-06 PROCEDURE — 23335 SHOULDER PROSTHESIS REMOVAL: CPT | Performed by: ORTHOPAEDIC SURGERY

## 2025-03-06 PROCEDURE — 6360000002 HC RX W HCPCS: Performed by: NURSE ANESTHETIST, CERTIFIED REGISTERED

## 2025-03-06 PROCEDURE — 1100000000 HC RM PRIVATE

## 2025-03-06 PROCEDURE — 82962 GLUCOSE BLOOD TEST: CPT

## 2025-03-06 PROCEDURE — 85652 RBC SED RATE AUTOMATED: CPT

## 2025-03-06 PROCEDURE — 7100000001 HC PACU RECOVERY - ADDTL 15 MIN: Performed by: ORTHOPAEDIC SURGERY

## 2025-03-06 PROCEDURE — 0RPK0JZ REMOVAL OF SYNTHETIC SUBSTITUTE FROM LEFT SHOULDER JOINT, OPEN APPROACH: ICD-10-PCS | Performed by: ORTHOPAEDIC SURGERY

## 2025-03-06 PROCEDURE — 87186 SC STD MICRODIL/AGAR DIL: CPT

## 2025-03-06 PROCEDURE — 87205 SMEAR GRAM STAIN: CPT

## 2025-03-06 PROCEDURE — 2700000000 HC OXYGEN THERAPY PER DAY

## 2025-03-06 PROCEDURE — 80048 BASIC METABOLIC PNL TOTAL CA: CPT

## 2025-03-06 PROCEDURE — 86850 RBC ANTIBODY SCREEN: CPT

## 2025-03-06 PROCEDURE — 3E0U029 INTRODUCTION OF OTHER ANTI-INFECTIVE INTO JOINTS, OPEN APPROACH: ICD-10-PCS | Performed by: ORTHOPAEDIC SURGERY

## 2025-03-06 PROCEDURE — 2500000003 HC RX 250 WO HCPCS: Performed by: ANESTHESIOLOGY

## 2025-03-06 PROCEDURE — 86140 C-REACTIVE PROTEIN: CPT

## 2025-03-06 PROCEDURE — C1713 ANCHOR/SCREW BN/BN,TIS/BN: HCPCS | Performed by: ORTHOPAEDIC SURGERY

## 2025-03-06 PROCEDURE — 87102 FUNGUS ISOLATION CULTURE: CPT

## 2025-03-06 PROCEDURE — 3600000015 HC SURGERY LEVEL 5 ADDTL 15MIN: Performed by: ORTHOPAEDIC SURGERY

## 2025-03-06 PROCEDURE — C1776 JOINT DEVICE (IMPLANTABLE): HCPCS | Performed by: ORTHOPAEDIC SURGERY

## 2025-03-06 PROCEDURE — 7100000000 HC PACU RECOVERY - FIRST 15 MIN: Performed by: ORTHOPAEDIC SURGERY

## 2025-03-06 PROCEDURE — 2709999900 HC NON-CHARGEABLE SUPPLY: Performed by: ORTHOPAEDIC SURGERY

## 2025-03-06 PROCEDURE — 24400 OSTEOT HUMERUS W/WO INT FIXJ: CPT | Performed by: ORTHOPAEDIC SURGERY

## 2025-03-06 PROCEDURE — 6370000000 HC RX 637 (ALT 250 FOR IP): Performed by: ORTHOPAEDIC SURGERY

## 2025-03-06 PROCEDURE — 6370000000 HC RX 637 (ALT 250 FOR IP): Performed by: ANESTHESIOLOGY

## 2025-03-06 PROCEDURE — 2500000003 HC RX 250 WO HCPCS: Performed by: ORTHOPAEDIC SURGERY

## 2025-03-06 PROCEDURE — 3700000000 HC ANESTHESIA ATTENDED CARE: Performed by: ORTHOPAEDIC SURGERY

## 2025-03-06 PROCEDURE — 83735 ASSAY OF MAGNESIUM: CPT

## 2025-03-06 PROCEDURE — 87070 CULTURE OTHR SPECIMN AEROBIC: CPT

## 2025-03-06 PROCEDURE — 94761 N-INVAS EAR/PLS OXIMETRY MLT: CPT

## 2025-03-06 PROCEDURE — 86901 BLOOD TYPING SEROLOGIC RH(D): CPT

## 2025-03-06 PROCEDURE — 2580000003 HC RX 258: Performed by: ANESTHESIOLOGY

## 2025-03-06 PROCEDURE — 3700000001 HC ADD 15 MINUTES (ANESTHESIA): Performed by: ORTHOPAEDIC SURGERY

## 2025-03-06 PROCEDURE — 2720000010 HC SURG SUPPLY STERILE: Performed by: ORTHOPAEDIC SURGERY

## 2025-03-06 PROCEDURE — 0RRK00Z REPLACEMENT OF LEFT SHOULDER JOINT WITH REVERSE BALL AND SOCKET SYNTHETIC SUBSTITUTE, OPEN APPROACH: ICD-10-PCS | Performed by: ORTHOPAEDIC SURGERY

## 2025-03-06 PROCEDURE — 20704 MNL PREP&INSJ I-ARTIC RX DEV: CPT | Performed by: ORTHOPAEDIC SURGERY

## 2025-03-06 PROCEDURE — 82306 VITAMIN D 25 HYDROXY: CPT

## 2025-03-06 PROCEDURE — 83789 MASS SPECTROMETRY QUAL/QUAN: CPT

## 2025-03-06 PROCEDURE — 3600000005 HC SURGERY LEVEL 5 BASE: Performed by: ORTHOPAEDIC SURGERY

## 2025-03-06 PROCEDURE — 85025 COMPLETE CBC W/AUTO DIFF WBC: CPT

## 2025-03-06 PROCEDURE — 73030 X-RAY EXAM OF SHOULDER: CPT

## 2025-03-06 PROCEDURE — 86900 BLOOD TYPING SEROLOGIC ABO: CPT

## 2025-03-06 PROCEDURE — 87075 CULTR BACTERIA EXCEPT BLOOD: CPT

## 2025-03-06 PROCEDURE — 87206 SMEAR FLUORESCENT/ACID STAI: CPT

## 2025-03-06 DEVICE — IMPLANTABLE DEVICE: Type: IMPLANTABLE DEVICE | Site: SHOULDER | Status: FUNCTIONAL

## 2025-03-06 DEVICE — STIMULAN® RAPID CURE PROVIDED STERILE FOR SINGLE PATIENT USE. STIMULAN® RAPID CURE CONTAINS CALCIUM SULFATE POWDER AND MIXING SOLUTION IN PRE-MEASURED QUANTITIES SO THAT WHEN MIXED TOGETHER IN A STERILE MIXING BOWL, THE RESULTANT PASTE IS TO BE DIGITALLY PACKED INTO OPEN BONE VOID/GAP TO SET INSITU OR PLACED INTO THE MOULD PROVIDED, THE MIXTURE SETS TO FORM BEADS. THE BIODEGRADABLE, RADIOPAQUE BEADS ARE RESORBED IN APPROXIMATELY 30 – 60 DAYS WHEN USED IN ACCORDANCE WITH THE DEVICE LABELLING. STIMULAN® RAPID CURE IS MANUFACTURED FROM SYNTHETIC IMPLANT GRADE CALCIUM SULFATE DIHYDRATE(CASO4.2H2O) THAT RESORBS AND IS REPLACED WITH BONE DURING THE HEALING PROCESS. ALSO, AS THE BONE VOID FILLER BEADS ARE BIODEGRADABLE AND BIOCOMPATIBLE, THEY MAY BE USED AT AN INFECTED SITE.
Type: IMPLANTABLE DEVICE | Site: SHOULDER | Status: FUNCTIONAL
Brand: STIMULAN® RAPID CURE

## 2025-03-06 DEVICE — SPACER SHLDR HD DIA50MM 06GM GENT BASE PMMA BNE CEM MOD HUM: Type: IMPLANTABLE DEVICE | Site: SHOULDER | Status: FUNCTIONAL

## 2025-03-06 RX ORDER — SODIUM CHLORIDE 0.9 % (FLUSH) 0.9 %
5-40 SYRINGE (ML) INJECTION EVERY 12 HOURS SCHEDULED
Status: DISCONTINUED | OUTPATIENT
Start: 2025-03-06 | End: 2025-03-11 | Stop reason: HOSPADM

## 2025-03-06 RX ORDER — DULOXETIN HYDROCHLORIDE 30 MG/1
30 CAPSULE, DELAYED RELEASE ORAL DAILY
Status: DISCONTINUED | OUTPATIENT
Start: 2025-03-07 | End: 2025-03-11 | Stop reason: HOSPADM

## 2025-03-06 RX ORDER — FENTANYL CITRATE 50 UG/ML
INJECTION, SOLUTION INTRAMUSCULAR; INTRAVENOUS
Status: DISCONTINUED | OUTPATIENT
Start: 2025-03-06 | End: 2025-03-06 | Stop reason: SDUPTHER

## 2025-03-06 RX ORDER — DEXAMETHASONE SODIUM PHOSPHATE 10 MG/ML
INJECTION, SOLUTION INTRA-ARTICULAR; INTRALESIONAL; INTRAMUSCULAR; INTRAVENOUS; SOFT TISSUE
Status: DISCONTINUED | OUTPATIENT
Start: 2025-03-06 | End: 2025-03-06 | Stop reason: SDUPTHER

## 2025-03-06 RX ORDER — GENTAMICIN SULFATE 80 MG/100ML
INJECTION, SOLUTION INTRAVENOUS CONTINUOUS PRN
Status: DISCONTINUED | OUTPATIENT
Start: 2025-03-06 | End: 2025-03-06 | Stop reason: HOSPADM

## 2025-03-06 RX ORDER — BUPIVACAINE HYDROCHLORIDE AND EPINEPHRINE 2.5; 5 MG/ML; UG/ML
INJECTION, SOLUTION EPIDURAL; INFILTRATION; INTRACAUDAL; PERINEURAL
Status: DISCONTINUED | OUTPATIENT
Start: 2025-03-06 | End: 2025-03-06 | Stop reason: SDUPTHER

## 2025-03-06 RX ORDER — BUPIVACAINE HYDROCHLORIDE AND EPINEPHRINE 5; 5 MG/ML; UG/ML
INJECTION, SOLUTION EPIDURAL; INTRACAUDAL; PERINEURAL
Status: DISCONTINUED | OUTPATIENT
Start: 2025-03-06 | End: 2025-03-06 | Stop reason: SDUPTHER

## 2025-03-06 RX ORDER — PROMETHAZINE HYDROCHLORIDE 25 MG/1
25 TABLET ORAL EVERY 4 HOURS PRN
Status: DISCONTINUED | OUTPATIENT
Start: 2025-03-06 | End: 2025-03-11 | Stop reason: HOSPADM

## 2025-03-06 RX ORDER — ROCURONIUM BROMIDE 10 MG/ML
INJECTION, SOLUTION INTRAVENOUS
Status: DISCONTINUED | OUTPATIENT
Start: 2025-03-06 | End: 2025-03-06 | Stop reason: SDUPTHER

## 2025-03-06 RX ORDER — SODIUM CHLORIDE 0.9 % (FLUSH) 0.9 %
5-40 SYRINGE (ML) INJECTION EVERY 12 HOURS SCHEDULED
Status: DISCONTINUED | OUTPATIENT
Start: 2025-03-06 | End: 2025-03-06 | Stop reason: HOSPADM

## 2025-03-06 RX ORDER — SPIRONOLACTONE 25 MG/1
50 TABLET ORAL DAILY
Status: DISCONTINUED | OUTPATIENT
Start: 2025-03-06 | End: 2025-03-06

## 2025-03-06 RX ORDER — ONDANSETRON 4 MG/1
4 TABLET, ORALLY DISINTEGRATING ORAL EVERY 8 HOURS PRN
Status: DISCONTINUED | OUTPATIENT
Start: 2025-03-06 | End: 2025-03-11 | Stop reason: HOSPADM

## 2025-03-06 RX ORDER — LIDOCAINE HYDROCHLORIDE 10 MG/ML
1 INJECTION, SOLUTION INFILTRATION; PERINEURAL
Status: DISCONTINUED | OUTPATIENT
Start: 2025-03-06 | End: 2025-03-06 | Stop reason: HOSPADM

## 2025-03-06 RX ORDER — BUPIVACAINE HYDROCHLORIDE AND EPINEPHRINE 5; 5 MG/ML; UG/ML
INJECTION, SOLUTION EPIDURAL; INTRACAUDAL; PERINEURAL
Status: COMPLETED | OUTPATIENT
Start: 2025-03-06 | End: 2025-03-06

## 2025-03-06 RX ORDER — DIPHENHYDRAMINE HYDROCHLORIDE 50 MG/ML
12.5 INJECTION, SOLUTION INTRAMUSCULAR; INTRAVENOUS
Status: DISCONTINUED | OUTPATIENT
Start: 2025-03-06 | End: 2025-03-06 | Stop reason: HOSPADM

## 2025-03-06 RX ORDER — LOSARTAN POTASSIUM AND HYDROCHLOROTHIAZIDE 25; 100 MG/1; MG/1
1 TABLET ORAL DAILY
Status: DISCONTINUED | OUTPATIENT
Start: 2025-03-06 | End: 2025-03-06

## 2025-03-06 RX ORDER — DEXTROSE MONOHYDRATE 100 MG/ML
INJECTION, SOLUTION INTRAVENOUS CONTINUOUS PRN
Status: DISCONTINUED | OUTPATIENT
Start: 2025-03-06 | End: 2025-03-06 | Stop reason: HOSPADM

## 2025-03-06 RX ORDER — FENTANYL CITRATE 50 UG/ML
100 INJECTION, SOLUTION INTRAMUSCULAR; INTRAVENOUS
Status: COMPLETED | OUTPATIENT
Start: 2025-03-06 | End: 2025-03-06

## 2025-03-06 RX ORDER — BISACODYL 10 MG
10 SUPPOSITORY, RECTAL RECTAL DAILY PRN
Status: DISCONTINUED | OUTPATIENT
Start: 2025-03-06 | End: 2025-03-11 | Stop reason: HOSPADM

## 2025-03-06 RX ORDER — SODIUM CHLORIDE 0.9 % (FLUSH) 0.9 %
5-40 SYRINGE (ML) INJECTION PRN
Status: DISCONTINUED | OUTPATIENT
Start: 2025-03-06 | End: 2025-03-11 | Stop reason: HOSPADM

## 2025-03-06 RX ORDER — SODIUM CHLORIDE 9 MG/ML
INJECTION, SOLUTION INTRAVENOUS PRN
Status: DISCONTINUED | OUTPATIENT
Start: 2025-03-06 | End: 2025-03-06 | Stop reason: HOSPADM

## 2025-03-06 RX ORDER — LIDOCAINE HYDROCHLORIDE 20 MG/ML
INJECTION, SOLUTION INFILTRATION; PERINEURAL
Status: DISCONTINUED | OUTPATIENT
Start: 2025-03-06 | End: 2025-03-06 | Stop reason: SDUPTHER

## 2025-03-06 RX ORDER — LISINOPRIL 20 MG/1
40 TABLET ORAL DAILY
Status: DISCONTINUED | OUTPATIENT
Start: 2025-03-07 | End: 2025-03-11 | Stop reason: HOSPADM

## 2025-03-06 RX ORDER — PROCHLORPERAZINE EDISYLATE 5 MG/ML
5 INJECTION INTRAMUSCULAR; INTRAVENOUS
Status: DISCONTINUED | OUTPATIENT
Start: 2025-03-06 | End: 2025-03-06 | Stop reason: HOSPADM

## 2025-03-06 RX ORDER — PROPOFOL 10 MG/ML
INJECTION, EMULSION INTRAVENOUS
Status: DISCONTINUED | OUTPATIENT
Start: 2025-03-06 | End: 2025-03-06 | Stop reason: SDUPTHER

## 2025-03-06 RX ORDER — PRAVASTATIN SODIUM 80 MG/1
80 TABLET ORAL NIGHTLY
Status: DISCONTINUED | OUTPATIENT
Start: 2025-03-06 | End: 2025-03-11 | Stop reason: HOSPADM

## 2025-03-06 RX ORDER — FERROUS SULFATE 325(65) MG
325 TABLET ORAL 2 TIMES DAILY WITH MEALS
Status: DISPENSED | OUTPATIENT
Start: 2025-03-07 | End: 2025-03-11

## 2025-03-06 RX ORDER — IBUPROFEN 600 MG/1
1 TABLET ORAL PRN
Status: DISCONTINUED | OUTPATIENT
Start: 2025-03-06 | End: 2025-03-06 | Stop reason: HOSPADM

## 2025-03-06 RX ORDER — MIDAZOLAM HYDROCHLORIDE 2 MG/2ML
2 INJECTION, SOLUTION INTRAMUSCULAR; INTRAVENOUS
Status: COMPLETED | OUTPATIENT
Start: 2025-03-06 | End: 2025-03-06

## 2025-03-06 RX ORDER — MAGNESIUM SULFATE IN WATER 40 MG/ML
2000 INJECTION, SOLUTION INTRAVENOUS ONCE
Status: COMPLETED | OUTPATIENT
Start: 2025-03-06 | End: 2025-03-07

## 2025-03-06 RX ORDER — SODIUM CHLORIDE, SODIUM LACTATE, POTASSIUM CHLORIDE, CALCIUM CHLORIDE 600; 310; 30; 20 MG/100ML; MG/100ML; MG/100ML; MG/100ML
INJECTION, SOLUTION INTRAVENOUS CONTINUOUS
Status: DISCONTINUED | OUTPATIENT
Start: 2025-03-06 | End: 2025-03-06 | Stop reason: HOSPADM

## 2025-03-06 RX ORDER — SODIUM CHLORIDE 0.9 % (FLUSH) 0.9 %
5-40 SYRINGE (ML) INJECTION PRN
Status: DISCONTINUED | OUTPATIENT
Start: 2025-03-06 | End: 2025-03-06 | Stop reason: HOSPADM

## 2025-03-06 RX ORDER — MAGNESIUM SULFATE IN WATER 40 MG/ML
2000 INJECTION, SOLUTION INTRAVENOUS ONCE
Status: COMPLETED | OUTPATIENT
Start: 2025-03-06 | End: 2025-03-06

## 2025-03-06 RX ORDER — SODIUM CHLORIDE, SODIUM LACTATE, POTASSIUM CHLORIDE, CALCIUM CHLORIDE 600; 310; 30; 20 MG/100ML; MG/100ML; MG/100ML; MG/100ML
INJECTION, SOLUTION INTRAVENOUS
Status: DISCONTINUED | OUTPATIENT
Start: 2025-03-06 | End: 2025-03-06 | Stop reason: SDUPTHER

## 2025-03-06 RX ORDER — OXYCODONE HYDROCHLORIDE 5 MG/1
10 TABLET ORAL
Status: DISCONTINUED | OUTPATIENT
Start: 2025-03-06 | End: 2025-03-08

## 2025-03-06 RX ORDER — ACETAMINOPHEN 500 MG
1000 TABLET ORAL ONCE
Status: COMPLETED | OUTPATIENT
Start: 2025-03-06 | End: 2025-03-06

## 2025-03-06 RX ORDER — ONDANSETRON 2 MG/ML
INJECTION INTRAMUSCULAR; INTRAVENOUS
Status: DISCONTINUED | OUTPATIENT
Start: 2025-03-06 | End: 2025-03-06 | Stop reason: SDUPTHER

## 2025-03-06 RX ORDER — OXYCODONE HYDROCHLORIDE 5 MG/1
5 TABLET ORAL
Status: DISCONTINUED | OUTPATIENT
Start: 2025-03-06 | End: 2025-03-06 | Stop reason: HOSPADM

## 2025-03-06 RX ORDER — DEXAMETHASONE SODIUM PHOSPHATE 4 MG/ML
INJECTION, SOLUTION INTRA-ARTICULAR; INTRALESIONAL; INTRAMUSCULAR; INTRAVENOUS; SOFT TISSUE
Status: COMPLETED | OUTPATIENT
Start: 2025-03-06 | End: 2025-03-06

## 2025-03-06 RX ORDER — QUETIAPINE FUMARATE 25 MG/1
25 TABLET, FILM COATED ORAL NIGHTLY
Status: DISCONTINUED | OUTPATIENT
Start: 2025-03-06 | End: 2025-03-06

## 2025-03-06 RX ORDER — SODIUM CHLORIDE 9 MG/ML
INJECTION, SOLUTION INTRAVENOUS PRN
Status: DISCONTINUED | OUTPATIENT
Start: 2025-03-06 | End: 2025-03-11 | Stop reason: HOSPADM

## 2025-03-06 RX ORDER — SODIUM PHOSPHATE, DIBASIC AND SODIUM PHOSPHATE, MONOBASIC 7; 19 G/230ML; G/230ML
1 ENEMA RECTAL
Status: DISPENSED | OUTPATIENT
Start: 2025-03-06 | End: 2025-03-07

## 2025-03-06 RX ORDER — DOCUSATE SODIUM 100 MG/1
100 CAPSULE, LIQUID FILLED ORAL 2 TIMES DAILY
Status: COMPLETED | OUTPATIENT
Start: 2025-03-07 | End: 2025-03-10

## 2025-03-06 RX ORDER — LIDOCAINE HYDROCHLORIDE 20 MG/ML
INJECTION, SOLUTION EPIDURAL; INFILTRATION; INTRACAUDAL; PERINEURAL
Status: DISCONTINUED | OUTPATIENT
Start: 2025-03-06 | End: 2025-03-06 | Stop reason: SDUPTHER

## 2025-03-06 RX ORDER — OXYCODONE HYDROCHLORIDE 5 MG/1
20 TABLET ORAL
Status: DISCONTINUED | OUTPATIENT
Start: 2025-03-06 | End: 2025-03-08

## 2025-03-06 RX ORDER — HYDROMORPHONE HYDROCHLORIDE 1 MG/ML
1 INJECTION, SOLUTION INTRAMUSCULAR; INTRAVENOUS; SUBCUTANEOUS
Status: DISCONTINUED | OUTPATIENT
Start: 2025-03-06 | End: 2025-03-11 | Stop reason: HOSPADM

## 2025-03-06 RX ORDER — SODIUM CHLORIDE 9 MG/ML
INJECTION, SOLUTION INTRAVENOUS PRN
Status: DISCONTINUED | OUTPATIENT
Start: 2025-03-06 | End: 2025-03-06

## 2025-03-06 RX ORDER — VANCOMYCIN HYDROCHLORIDE 1 G/20ML
INJECTION, POWDER, LYOPHILIZED, FOR SOLUTION INTRAVENOUS PRN
Status: DISCONTINUED | OUTPATIENT
Start: 2025-03-06 | End: 2025-03-06 | Stop reason: HOSPADM

## 2025-03-06 RX ORDER — NALOXONE HYDROCHLORIDE 0.4 MG/ML
INJECTION, SOLUTION INTRAMUSCULAR; INTRAVENOUS; SUBCUTANEOUS PRN
Status: DISCONTINUED | OUTPATIENT
Start: 2025-03-06 | End: 2025-03-06 | Stop reason: HOSPADM

## 2025-03-06 RX ADMIN — PRAVASTATIN SODIUM 80 MG: 80 TABLET ORAL at 21:15

## 2025-03-06 RX ADMIN — HYDROMORPHONE HYDROCHLORIDE 0.5 MG: 1 INJECTION, SOLUTION INTRAMUSCULAR; INTRAVENOUS; SUBCUTANEOUS at 16:59

## 2025-03-06 RX ADMIN — SODIUM CHLORIDE, SODIUM LACTATE, POTASSIUM CHLORIDE, AND CALCIUM CHLORIDE: 600; 310; 30; 20 INJECTION, SOLUTION INTRAVENOUS at 13:51

## 2025-03-06 RX ADMIN — PROPOFOL 25 MG: 10 INJECTION, EMULSION INTRAVENOUS at 17:45

## 2025-03-06 RX ADMIN — SODIUM CHLORIDE, SODIUM LACTATE, POTASSIUM CHLORIDE, AND CALCIUM CHLORIDE: .6; .31; .03; .02 INJECTION, SOLUTION INTRAVENOUS at 11:55

## 2025-03-06 RX ADMIN — ONDANSETRON 8 MG: 2 INJECTION INTRAMUSCULAR; INTRAVENOUS at 15:47

## 2025-03-06 RX ADMIN — DEXAMETHASONE SODIUM PHOSPHATE 10 MG: 10 INJECTION INTRAMUSCULAR; INTRAVENOUS at 14:50

## 2025-03-06 RX ADMIN — LIDOCAINE HYDROCHLORIDE 5 ML: 20 INJECTION, SOLUTION INFILTRATION; PERINEURAL at 17:45

## 2025-03-06 RX ADMIN — SODIUM CHLORIDE, PRESERVATIVE FREE 10 ML: 5 INJECTION INTRAVENOUS at 21:16

## 2025-03-06 RX ADMIN — FENTANYL CITRATE 50 MCG: 50 INJECTION INTRAMUSCULAR; INTRAVENOUS at 12:42

## 2025-03-06 RX ADMIN — ACETAMINOPHEN 1000 MG: 500 TABLET, FILM COATED ORAL at 12:00

## 2025-03-06 RX ADMIN — ROCURONIUM BROMIDE 10 MG: 10 INJECTION, SOLUTION INTRAVENOUS at 15:40

## 2025-03-06 RX ADMIN — Medication 2000 MG: at 14:49

## 2025-03-06 RX ADMIN — PHENYLEPHRINE HYDROCHLORIDE 50 MCG/MIN: 10 INJECTION INTRAVENOUS at 15:33

## 2025-03-06 RX ADMIN — MAGNESIUM SULFATE HEPTAHYDRATE 2000 MG: 40 INJECTION, SOLUTION INTRAVENOUS at 23:58

## 2025-03-06 RX ADMIN — SUGAMMADEX 200 MG: 100 INJECTION, SOLUTION INTRAVENOUS at 15:53

## 2025-03-06 RX ADMIN — PHENYLEPHRINE HYDROCHLORIDE 100 MCG: 0.1 INJECTION, SOLUTION INTRAVENOUS at 15:31

## 2025-03-06 RX ADMIN — BUPIVACAINE HYDROCHLORIDE AND EPINEPHRINE 10 ML: 5; 5 INJECTION, SOLUTION EPIDURAL; INTRACAUDAL; PERINEURAL at 17:45

## 2025-03-06 RX ADMIN — WATER 1000 MG: 1 INJECTION INTRAMUSCULAR; INTRAVENOUS; SUBCUTANEOUS at 21:15

## 2025-03-06 RX ADMIN — OXYCODONE 10 MG: 5 TABLET ORAL at 23:54

## 2025-03-06 RX ADMIN — ROCURONIUM BROMIDE 20 MG: 10 INJECTION, SOLUTION INTRAVENOUS at 15:04

## 2025-03-06 RX ADMIN — DEXAMETHASONE SODIUM PHOSPHATE 4 MG: 4 INJECTION INTRA-ARTICULAR; INTRALESIONAL; INTRAMUSCULAR; INTRAVENOUS; SOFT TISSUE at 12:41

## 2025-03-06 RX ADMIN — MIDAZOLAM HYDROCHLORIDE 2 MG: 1 INJECTION, SOLUTION INTRAMUSCULAR; INTRAVENOUS at 12:41

## 2025-03-06 RX ADMIN — MAGNESIUM SULFATE HEPTAHYDRATE 2000 MG: 40 INJECTION, SOLUTION INTRAVENOUS at 18:41

## 2025-03-06 RX ADMIN — BUPIVACAINE HYDROCHLORIDE AND EPINEPHRINE BITARTRATE 30 ML: 5; .005 INJECTION, SOLUTION EPIDURAL; INTRACAUDAL; PERINEURAL at 12:41

## 2025-03-06 RX ADMIN — ROCURONIUM BROMIDE 50 MG: 10 INJECTION, SOLUTION INTRAVENOUS at 14:11

## 2025-03-06 RX ADMIN — SODIUM CHLORIDE, SODIUM LACTATE, POTASSIUM CHLORIDE, AND CALCIUM CHLORIDE: 600; 310; 30; 20 INJECTION, SOLUTION INTRAVENOUS at 14:40

## 2025-03-06 RX ADMIN — VANCOMYCIN HYDROCHLORIDE 1250 MG: 10 INJECTION, POWDER, LYOPHILIZED, FOR SOLUTION INTRAVENOUS at 14:46

## 2025-03-06 RX ADMIN — LIDOCAINE HYDROCHLORIDE 40 MG: 20 INJECTION, SOLUTION EPIDURAL; INFILTRATION; INTRACAUDAL; PERINEURAL at 14:10

## 2025-03-06 RX ADMIN — HYDROMORPHONE HYDROCHLORIDE 0.5 MG: 1 INJECTION, SOLUTION INTRAMUSCULAR; INTRAVENOUS; SUBCUTANEOUS at 17:24

## 2025-03-06 RX ADMIN — PROPOFOL 200 MG: 10 INJECTION, EMULSION INTRAVENOUS at 14:10

## 2025-03-06 RX ADMIN — BUPIVACAINE HYDROCHLORIDE AND EPINEPHRINE BITARTRATE 10 ML: 2.5; .005 INJECTION, SOLUTION EPIDURAL; INFILTRATION; INTRACAUDAL; PERINEURAL at 17:45

## 2025-03-06 RX ADMIN — FENTANYL CITRATE 100 MCG: 50 INJECTION, SOLUTION INTRAMUSCULAR; INTRAVENOUS at 15:57

## 2025-03-06 RX ADMIN — FENTANYL CITRATE 100 MCG: 50 INJECTION, SOLUTION INTRAMUSCULAR; INTRAVENOUS at 14:10

## 2025-03-06 RX ADMIN — PHENYLEPHRINE HYDROCHLORIDE 100 MCG: 0.1 INJECTION, SOLUTION INTRAVENOUS at 16:04

## 2025-03-06 RX ADMIN — PHENYLEPHRINE HYDROCHLORIDE 100 MCG: 0.1 INJECTION, SOLUTION INTRAVENOUS at 16:13

## 2025-03-06 ASSESSMENT — PAIN DESCRIPTION - ORIENTATION
ORIENTATION: LEFT

## 2025-03-06 ASSESSMENT — PAIN DESCRIPTION - DESCRIPTORS
DESCRIPTORS: PENETRATING
DESCRIPTORS: ACHING
DESCRIPTORS: PRESSURE

## 2025-03-06 ASSESSMENT — PAIN DESCRIPTION - LOCATION
LOCATION: SHOULDER
LOCATION: ARM
LOCATION: ARM

## 2025-03-06 ASSESSMENT — PAIN SCALES - GENERAL
PAINLEVEL_OUTOF10: 7
PAINLEVEL_OUTOF10: 3
PAINLEVEL_OUTOF10: 0
PAINLEVEL_OUTOF10: 6

## 2025-03-06 ASSESSMENT — PAIN - FUNCTIONAL ASSESSMENT: PAIN_FUNCTIONAL_ASSESSMENT: 0-10

## 2025-03-06 ASSESSMENT — COPD QUESTIONNAIRES: CAT_SEVERITY: MILD

## 2025-03-06 NOTE — PROGRESS NOTES
Patient/caregivers speak Beninese  as their preferred language for their healthcare communication. For safe communication, use the [employer]  carts or call:    Senior  Navigator Annabelle Florentino at [phone] or   [employer] phone services for [facility] at [phone]    General phone: [phone] ( [phone])  Email: [email or web address]    Always document the use of interpreting services ('s ID number) in your clinical notes.    Our interpreters are available for team members working with limited  English proficient (LEP) patients remotely, via phone or video or in person (if needed for special cases).    When using family members to interpret, for the safety of the patient and protection of the communication of both our patient and [facility] staff the VRI or telephonic  should remain on the line to monitor that all communication is accurate and complete. The  should be instructed to notify [facility] staff immediately if there are any inaccuracies.         Thank you,        Annabelle NICOLE  Senior /Navigator

## 2025-03-06 NOTE — RT PROTOCOL NOTE
Respiratory Care Services     Policy Number: [id number]    Title: Oxygen Protocol    Effective Date: 01/1996    Revised Date: 06/2013, 02/29/2016, 4/2018, 7/2019    Reviewed Date: 05/2014, 03/2015, 06/2017, 11/2020        I.  Policy: The Oxygen Protocol will be initiated for all patients upon written order from physician for administration of oxygen therapy or if a patient is found to have an oxygen saturation of 88% or less. Special consideration: the goal of oxygen therapy for COPD patients is to maintain oxygen saturation between 88% - 92% to comply with GOLD Guidelines.    II. Purpose: To provide protocol driven respiratory therapy for the administration of oxygen at concentrations greater than that in ambient air with the intent of treating or preventing the symptoms and manifestations of hypoxia.    III. Responsibility: Director Respiratory Care Services, all Respiratory Care Practitioners     IV. Indications:   Implement this protocol for patients when physician orders oxygen to be administered or when patient is found to have an oxygen saturation of 88% or less.  To assure routine monitoring of patient's oxygen saturation b.i.d. and to make appropriate adjustments in accordance with ordered oxygen saturation parameters.  To assure continuity of respiratory care that meets [facility] Clinical Practice Guidelines and GOLD Guidelines.  Hb < 8  Sickle Cell anemia crisis    V. Assessment:  Assess the following parameters to determine the need to adjust oxygen:  Measurement of patient's oxygen saturation via pulse oximetry.    Observation of patient's color, respiratory effort, and responsiveness.  Measurement of heart rate and respiratory rate.  Complete a three-step ambulatory oxygen saturation when ordered.    VI. Initiation:  Upon receipt of an order for oxygen, the RCP will:   Verify order in the patient's EMR, which should include the desired oxygen saturation to be maintained.  The patient shall be placed on  1998; 43:719-723   L Therapist Driven Respiratory Care Protocols - A Practitioner's Guide for Criteria-Based Respiratory Care by Kit Villafana M.D., and ANDRE Machuca RN, RRT.   L The rationale for therapist-driven protocols: an update. Respiratory Care 1998; 43:719-723.   N   [facility] Clinical Practice Guidelines.          The RCP will perform a respiratory assessment in the following manner:  Perform hand hygiene per hospital policy utilizing Standard Precautions for all patients and following transmission-based isolation as indicated per policy.  Identify patient via ID bracelet verifying patient name and birth date.  General observations: color, pattern and effort of breathing, chest expansion, (symmetrical and bilateral), level of consciousness and the ability to ambulate.  The RCP will assess patient’s cough ability and determine if Nasotracheal suctioning is needed. If patient is unable to produce sputum, at that time, the RCP should question the patient with regard to their sputum: production, color consistency, frequency and amount.  Auscultation: Using a stethoscope, the RCP will listen and note quality of breath sounds and presence or absence of adventitious breath sounds in all lung fields, both anteriorly and posteriorly.  Upon completing the EMR review and physical assessment, the RCP will document findings in the “RT Assessment section” of the EMR. The score level will be provided and will be used to determine the frequency of therapy.    V. Indications:   A.  Indications for Bronchial Hygiene Protocol will include:  Potential for or presence of atelectasis.   Need for hydration and removal of retained secretions.  Need for improvement of cough effectiveness.  Presence of conditions associated with disorder of pulmonary clearance:  Cystic fibrosis  Bronchiectasis   Indications for Aerosolized Medication(s) Protocol should include:  Treatment of bronchospasm/wheezing  Improvement of mucociliary  arrhythmias, and are ordered Xopenex and Atrovent may be changed to Duoneb.      VII. Safety:        The following safety issues shall be monitored:  The RCP will perform hand hygiene per hospital policy utilizing Standard Precautions for all patients and following transmission-based isolation as indicated per hospital policy.  The RCP must exercise professional judgment in classifying the patient for frequency of therapy.  Appropriate classification of the patient will require an evaluation utilizing the Therapy Assessment Protocol Guidelines.  The RCP will confer with the physician concerning the care of the patient at any time questions or problems arise.  If during therapy, the patient exhibits no improvement or deterioration in clinical status the RCP will notify the physician and the patient’s nurse.      VIII. Interventions:   The patient’s nurse is responsible concerning all items related to his/her care. Ongoing communication with nursing is essential to successful protocol management.  The RCP recognizes the value of the team approach in meeting the patient’s needs. Nursing input regarding the patient’s pulmonary condition will be sought as needed.     IX. Reportable conditions:   The RCP will inform the physician if:  There are acute changes in patient’s respiratory status.  The therapist is unable to determine appropriate care plan upon assessment.  The patient fails to reach therapeutic objective.  A change or additional medication is needed.    X.  Patient Education:    Patient will receive instruction on the following:  The treatment modality, including objectives and proper technique of therapy  Respiratory medications  Documentation shall occur in the “patient education” section of the patient’s EMR.    XI. Documentation: Record all findings as described above in the patient’s EMR.    Related Protocols: A. Aerosolized Medication Protocol  Bronchial Hygiene  Oxygen Protocol   Volume

## 2025-03-06 NOTE — PROGRESS NOTES
VANCO DAILY FOLLOW UP NOTE  [facility]   Pharmacy Pharmacokinetic Monitoring Service - Vancomycin    Consulting Provider: Posta   Indication: Surgical Site Infection  Target Concentration: Goal AUC/MANUEL 400-600 mg*hr/L  Day of Therapy: 1  Additional Antimicrobials: Ancef    Pertinent Laboratory Values:   Wt Readings from Last 1 Encounters:   03/06/25 77.2 kg (170 lb 4.8 oz)     Temp Readings from Last 1 Encounters:   03/06/25 97.9 °F (36.6 °C) (Temporal)     No results for input(s): \"BUN\", \"CREATININE\", \"WBC\", \"PROCAL\", \"LACACIDPL\", \"LACTA\", \"LCAD\" in the last 72 hours.    Invalid input(s): \"PROCAT\", \"PCT\", \"LAC\", \"LACT\", \"LACPOC\"  Estimated Creatinine Clearance: 67 mL/min (based on SCr of 1.04 mg/dL).    No results found for: \"VANCOTROUGH\", \"VANCORANDOM\"    MRSA Nasal Swab: N/A. Non-respiratory infection    Assessment:  Date/Time Dose Concentration AUC         Note: Serum concentrations collected for AUC dosing may appear elevated if collected in close proximity to the dose administered, this is not necessarily an indication of toxicity    Plan:  Dosing recommendations based on Bayesian software  Start vancomycin 750mg IV q12h.  Anticipated AUC of 477 and trough concentration of 14.5 at steady state  Renal labs as indicated   Vancomycin concentrations will be ordered as clinically appropriate   Pharmacy will continue to monitor patient and adjust therapy as indicated    Thank you for the consult,    Ras Obrien, GeraldD, BCPS

## 2025-03-06 NOTE — PERIOP NOTE
Pt c/o of severe pinching where the incision is. Pt shows lots of frustration and feels that he is not being understood even with . Daughter brought to bedside to help ease pt and help with re-explaining the situation. Dr Cartwright has come to bedside to determine if re block is neccessary.

## 2025-03-06 NOTE — PROGRESS NOTES
TRANSFER - IN REPORT:    [facility] for routine post-op      Report consisted of patient's Situation, Background, Assessment and   Recommendations(SBAR).     Information from the following report(s) Nurse Handoff Report was reviewed with the receiving nurse.    Opportunity for questions and clarification was provided.      Report given to on coming nurse

## 2025-03-06 NOTE — ANESTHESIA PROCEDURE NOTES
Airway  Date/Time: 3/6/2025 2:12 PM  Urgency: elective    Airway not difficult    General Information and Staff    Patient location during procedure: OR  Anesthesiologist: Hemant Stevens MD  Resident/CRNA: Isaac Akhtar APRN - CRNA  Performed: resident/CRNA   Performed by: Isaac Akhtar APRN - CRNA  Authorized by: Hemant Stevens MD      Indications and Patient Condition  Indications for airway management: anesthesia and airway protection  Spontaneous Ventilation: absent  Sedation level: deep  Preoxygenated: yes  Patient position: sniffing  MILS not maintained throughout  Mask difficulty assessment: vent by bag mask + OA or adjuvant +/- NMBA    Final Airway Details  Final airway type: endotracheal airway      Successful airway: ETT  Cuffed: yes   Successful intubation technique: direct laryngoscopy  Facilitating devices/methods: intubating stylet  Endotracheal tube insertion site: oral  Blade: Saldivar  Blade size: #2  ETT size (mm): 8.0  Cormack-Lehane Classification: grade I - full view of glottis  Placement verified by: chest auscultation and capnometry   Measured from: teeth  ETT to teeth (cm): 22  Number of attempts at approach: 1  Ventilation between attempts: bag mask  Number of other approaches attempted: 0    no      
Peripheral Block    Patient location during procedure: pre-op  Reason for block: post-op pain management and at surgeon's request  Start time: 3/6/2025 12:41 PM  End time: 3/6/2025 12:44 PM  Staffing  Performed: anesthesiologist   Anesthesiologist: Hemant Stevens MD  Performed by: Hemant Stevens MD  Authorized by: Hemant Stevens MD    Preanesthetic Checklist  Completed: patient identified, IV checked, site marked, risks and benefits discussed, surgical/procedural consents, equipment checked, pre-op evaluation, timeout performed, anesthesia consent given, oxygen available and monitors applied/VS acknowledged  Peripheral Block   Patient position: sitting  Prep: ChloraPrep  Provider prep: sterile gloves and mask  Patient monitoring: cardiac monitor, continuous pulse ox, frequent blood pressure checks, IV access, oxygen and responsive to questions  Block type: Brachial plexus  Interscalene  Laterality: left  Injection technique: single-shot  Guidance: ultrasound guided    Needle   Needle type: insulated echogenic nerve stimulator needle   Needle localization: ultrasound guidance  Assessment   Injection assessment: negative aspiration for heme, no paresthesia on injection, local visualized surrounding nerve on ultrasound and no intravascular symptoms  Paresthesia pain: none  Slow fractionated injection: yes  Hemodynamics: stable  Outcomes: uncomplicated and patient tolerated procedure well    Additional Notes  Ultrasound image taken and stored in chart   Medications Administered  BUPivacaine 0.5%-EPINEPHrine injection 1:[id number] - Perineural   30 mL - 3/6/2025 12:41:00 PM  dexAMETHasone (DECADRON) injection 4 mg/mL - Perineural   4 mg - 3/6/2025 12:41:00 PM          
PM

## 2025-03-06 NOTE — BRIEF OP NOTE
BRIEF OPERATIVE NOTE    Date of Procedure: 3/6/2025     Preoperative Diagnosis:  PERIPROSTHETIC INFECTION S/P REVERSE LEFT TOTAL SHOULDER ARTHROPLASTY    Postoperative Diagnosis:  SAME    Procedure(s)  1.  INCISION, DEBRIDEMENT, REMOVAL IMPLANT LEFT SHOULDER \"RTSA\" WITH A HUMERAL OSTEOTOMY     2.  INSERTION REMEDY ANTIBIOTIC CEMENT SPACER AND ANTIBIOTIC BEADS LEFT SHOULDER    Surgeons and Role:     * Rajinder Castle Jr., MD - Primary         Assistant Staff:  NONE    Surgical Staff:  Circulator: (Unknown)  Scrub Person First: (Unknown)  Scrub Person Second: (Unknown)      * Missing procedure start or end time(s) *    Anesthesia:  GENERAL WITH INTERSCALENE BLOCK    Estimated Blood Loss: 300 cc.    Specimens:   ID Type Source Tests Collected by Time Destination   1 : LEFT SHOULDER #1 Swab Shoulder CULTURE, ANAEROBIC, CULTURE, FUNGUS, CULTURE, WOUND (WITH GRAM STAIN) Rajinder Castle Jr., MD 3/6/2025 1445    2 : LEFT SHOULDER #2 Swab Shoulder CULTURE, ANAEROBIC, CULTURE, FUNGUS, CULTURE, WOUND (WITH GRAM STAIN) Rajinder Castle Jr., MD 3/6/2025 1445    3 : LEFT SHOULDER #3 Swab Shoulder CULTURE, ANAEROBIC, CULTURE, FUNGUS, CULTURE, WOUND (WITH GRAM STAIN) Rajinder Castle Jr., MD 3/6/2025 1445         Complications: NONE    Implants:   Implant Name Type Inv. Item Serial No.  Lot No. LRB No. Used Action   GRAFT BNE SUB 10CC BEAD 25CC CA SULPHATE RAP SET W/ INDIV - [id number]  GRAFT BNE SUB 10CC BEAD 25CC CA SULPHATE RAP SET W/ INDIV  [employer]- [id number] Left 2 Implanted   STEM HUM M L116MM OD12.6X10.5MM MOD FOR REMEDY SHLDR SYS - [id number]  STEM HUM M L116MM OD12.6X10.5MM MOD FOR REMEDY SHLDR SYS  [employer] [id number] Left 1 Implanted   SPACER SHLDR HD [id number] 06GM GENT BASE PMMA BNE ANNA MOD HUM - [id number]  SPACER SHLDR HD [id number] 06GM GENT BASE PMMA BNE ANNA MOD HUM  [employer] [id number] Left 1 Implanted       RAJINDER CASTLE JR, MD

## 2025-03-06 NOTE — ANESTHESIA PRE PROCEDURE
auscultation  (+)  COPD: mild,    sleep apnea:                                  Cardiovascular:  Exercise tolerance: no interval change, Limited by CVA  (+) hypertension:, hyperlipidemia        Rhythm: regular  Rate: normal                    Neuro/Psych:   (+) CVA (residual L sided weakness): residual symptomsdepression/anxiety             GI/Hepatic/Renal:   (+) GERD: well controlled, liver disease (fatty liver):          Endo/Other:    (+) DiabetesType II DM, : arthritis:..                 Abdominal:             Vascular:          Other Findings:             Anesthesia Plan      general     ASA 3     (GETA + PNB)  Induction: intravenous.      Anesthetic plan and risks discussed with patient (family present. Interpretive assistance).              Post-op pain plan if not by surgeon: single peripheral nerve block            Hemant Stevens MD   3/6/2025

## 2025-03-06 NOTE — H&P
Update History & Physical    The patient's History and Physical of March 3, 2025 was reviewed with the patient and I examined the patient. There was no change. The surgical site was confirmed by the patient and me.       Plan: The risks, benefits, expected outcome, and alternative to the recommended procedure have been discussed with the patient. Patient understands and wants to proceed with the procedure.     Electronically signed by JOSUE DELUCA JR, MD on 3/6/2025 at 6:23 AM

## 2025-03-06 NOTE — PERIOP NOTE
TRANSFER - OUT REPORT:    Verbal report given to BOO Sands on Zack Treviño  being transferred to [facility] for routine progression of patient care       Report consisted of patient’s Situation, Background, Assessment and Recommendations(SBAR).     Information from the following report(s) OR Summary, Procedure Summary, MAR, Recent Results, Med Rec Status, Cardiac Rhythm SR, and Quality Measures was reviewed with the receiving nurse.    Opportunity for questions and clarification was provided.

## 2025-03-07 LAB
ANION GAP SERPL CALC-SCNC: 11 MMOL/L (ref 7–16)
BUN SERPL-MCNC: 15 MG/DL (ref 8–23)
CALCIUM SERPL-MCNC: 8.8 MG/DL (ref 8.8–10.2)
CHLORIDE SERPL-SCNC: 98 MMOL/L (ref 98–107)
CO2 SERPL-SCNC: 25 MMOL/L (ref 20–29)
CREAT SERPL-MCNC: 0.64 MG/DL (ref 0.8–1.3)
ERYTHROCYTE [DISTWIDTH] IN BLOOD BY AUTOMATED COUNT: 11.9 % (ref 11.9–14.6)
GLUCOSE BLD STRIP.AUTO-MCNC: 203 MG/DL (ref 65–100)
GLUCOSE SERPL-MCNC: 239 MG/DL (ref 70–99)
HCT VFR BLD AUTO: 28.2 % (ref 41.1–50.3)
HGB BLD-MCNC: 9.1 G/DL (ref 13.6–17.2)
MAGNESIUM SERPL-MCNC: 2 MG/DL (ref 1.8–2.4)
MCH RBC QN AUTO: 28 PG (ref 26.1–32.9)
MCHC RBC AUTO-ENTMCNC: 32.3 G/DL (ref 31.4–35)
MCV RBC AUTO: 86.8 FL (ref 82–102)
NRBC # BLD: 0 K/UL (ref 0–0.2)
PLATELET # BLD AUTO: 294 K/UL (ref 150–450)
PMV BLD AUTO: 9.6 FL (ref 9.4–12.3)
POTASSIUM SERPL-SCNC: 3.8 MMOL/L (ref 3.5–5.1)
RBC # BLD AUTO: 3.25 M/UL (ref 4.23–5.6)
SERVICE CMNT-IMP: ABNORMAL
SODIUM SERPL-SCNC: 134 MMOL/L (ref 136–145)
WBC # BLD AUTO: 8 K/UL (ref 4.3–11.1)

## 2025-03-07 PROCEDURE — 80048 BASIC METABOLIC PNL TOTAL CA: CPT

## 2025-03-07 PROCEDURE — 2580000003 HC RX 258: Performed by: ORTHOPAEDIC SURGERY

## 2025-03-07 PROCEDURE — 6360000002 HC RX W HCPCS: Performed by: ORTHOPAEDIC SURGERY

## 2025-03-07 PROCEDURE — 97535 SELF CARE MNGMENT TRAINING: CPT

## 2025-03-07 PROCEDURE — 97161 PT EVAL LOW COMPLEX 20 MIN: CPT

## 2025-03-07 PROCEDURE — 6370000000 HC RX 637 (ALT 250 FOR IP): Performed by: FAMILY MEDICINE

## 2025-03-07 PROCEDURE — 82962 GLUCOSE BLOOD TEST: CPT

## 2025-03-07 PROCEDURE — 97165 OT EVAL LOW COMPLEX 30 MIN: CPT

## 2025-03-07 PROCEDURE — 1100000000 HC RM PRIVATE

## 2025-03-07 PROCEDURE — 36415 COLL VENOUS BLD VENIPUNCTURE: CPT

## 2025-03-07 PROCEDURE — 85027 COMPLETE CBC AUTOMATED: CPT

## 2025-03-07 PROCEDURE — 6370000000 HC RX 637 (ALT 250 FOR IP): Performed by: ORTHOPAEDIC SURGERY

## 2025-03-07 PROCEDURE — 97530 THERAPEUTIC ACTIVITIES: CPT

## 2025-03-07 PROCEDURE — 2500000003 HC RX 250 WO HCPCS: Performed by: ORTHOPAEDIC SURGERY

## 2025-03-07 PROCEDURE — 83735 ASSAY OF MAGNESIUM: CPT

## 2025-03-07 RX ORDER — IBUPROFEN 600 MG/1
1 TABLET ORAL PRN
Status: DISCONTINUED | OUTPATIENT
Start: 2025-03-07 | End: 2025-03-11 | Stop reason: HOSPADM

## 2025-03-07 RX ORDER — DEXTROSE MONOHYDRATE 100 MG/ML
INJECTION, SOLUTION INTRAVENOUS CONTINUOUS PRN
Status: DISCONTINUED | OUTPATIENT
Start: 2025-03-07 | End: 2025-03-11 | Stop reason: HOSPADM

## 2025-03-07 RX ORDER — INSULIN LISPRO 100 [IU]/ML
0-4 INJECTION, SOLUTION INTRAVENOUS; SUBCUTANEOUS
Status: DISCONTINUED | OUTPATIENT
Start: 2025-03-07 | End: 2025-03-11 | Stop reason: HOSPADM

## 2025-03-07 RX ADMIN — VANCOMYCIN HYDROCHLORIDE 750 MG: 750 INJECTION, POWDER, LYOPHILIZED, FOR SOLUTION INTRAVENOUS at 02:05

## 2025-03-07 RX ADMIN — OXYCODONE 20 MG: 5 TABLET ORAL at 15:54

## 2025-03-07 RX ADMIN — VANCOMYCIN HYDROCHLORIDE 750 MG: 750 INJECTION, POWDER, LYOPHILIZED, FOR SOLUTION INTRAVENOUS at 14:30

## 2025-03-07 RX ADMIN — OXYCODONE 20 MG: 5 TABLET ORAL at 08:34

## 2025-03-07 RX ADMIN — WATER 1000 MG: 1 INJECTION INTRAMUSCULAR; INTRAVENOUS; SUBCUTANEOUS at 17:10

## 2025-03-07 RX ADMIN — FERROUS SULFATE TAB 325 MG (65 MG ELEMENTAL FE) 325 MG: 325 (65 FE) TAB at 08:36

## 2025-03-07 RX ADMIN — PRAVASTATIN SODIUM 80 MG: 80 TABLET ORAL at 20:50

## 2025-03-07 RX ADMIN — LISINOPRIL 40 MG: 20 TABLET ORAL at 08:36

## 2025-03-07 RX ADMIN — DULOXETINE HYDROCHLORIDE 30 MG: 30 CAPSULE, DELAYED RELEASE ORAL at 08:36

## 2025-03-07 RX ADMIN — INSULIN LISPRO 1 UNITS: 100 INJECTION, SOLUTION INTRAVENOUS; SUBCUTANEOUS at 21:02

## 2025-03-07 RX ADMIN — DOCUSATE SODIUM 100 MG: 100 CAPSULE, LIQUID FILLED ORAL at 08:35

## 2025-03-07 RX ADMIN — OXYCODONE 20 MG: 5 TABLET ORAL at 21:03

## 2025-03-07 RX ADMIN — DOCUSATE SODIUM 100 MG: 100 CAPSULE, LIQUID FILLED ORAL at 20:50

## 2025-03-07 RX ADMIN — WATER 1000 MG: 1 INJECTION INTRAMUSCULAR; INTRAVENOUS; SUBCUTANEOUS at 05:48

## 2025-03-07 ASSESSMENT — PAIN DESCRIPTION - ORIENTATION
ORIENTATION: RIGHT
ORIENTATION: LEFT
ORIENTATION: LEFT

## 2025-03-07 ASSESSMENT — PAIN DESCRIPTION - LOCATION
LOCATION: SHOULDER

## 2025-03-07 ASSESSMENT — PAIN SCALES - GENERAL
PAINLEVEL_OUTOF10: 6
PAINLEVEL_OUTOF10: 4
PAINLEVEL_OUTOF10: 6
PAINLEVEL_OUTOF10: 6

## 2025-03-07 ASSESSMENT — PAIN DESCRIPTION - DESCRIPTORS
DESCRIPTORS: THROBBING
DESCRIPTORS: THROBBING

## 2025-03-07 NOTE — OP NOTE
[facility] & [address]  [address]                            OPERATIVE REPORT      PATIENT NAME: YAHAIRA EVANS                : 1955  MED REC NO: [id number]                       ROOM: 312  ACCOUNT NO: [id number]                       ADMIT DATE: 2025  PROVIDER: Rajinder Castle Jr, MD    DATE OF SERVICE:  2025    PREOPERATIVE DIAGNOSES:  Periprosthetic infection status post reverse left total shoulder arthroplasty.    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    PROCEDURES PERFORMED:       1. Incision, debridement, removal of implant left shoulder, reverse left total shoulder arthroplasty with a humeral osteotomy.     2. Insertion of Remedy antibiotic cement spacer and antibiotic beads, left shoulder.    SURGEON:  Rajinder Castle Jr, MD        ANESTHESIA:  General with interscalene block.    ESTIMATED BLOOD LOSS:  300 mL.    SPECIMENS REMOVED:  Cultures were obtained and labelled 1, 2, and 3 from the left shoulder.    The patient then received 2 g of IV Ancef and weight-adjusted vancomycin.    INTRAOPERATIVE FINDINGS:  Pathology is periprosthetic infection.     COMPLICATIONS:  None.    IMPLANTS:  Hardware placed was a Remedy medium stem and large head cement spacer as well as antibiotic beads.    INDICATIONS:  The patient is a 69-year-old gentleman with multiple medical issues, who is over 11 years status post a reverse shoulder arthroplasty.  The patient presented to the emergency room with pus draining from his left shoulder.  CT scan and physical exam are consistent with a periprosthetic infection.  He is now brought to the operative suite for operative intervention.    DESCRIPTION OF PROCEDURE:  Following identification of the patient, the patient was taken to the operative suite.  Following administration of general anesthesia, interscalene block for postop pain  the appropriate spacer.  Again, the glenoid and humerus were then irrigated with additional 3 L of jet pulse lavage.  At this point, there was no further evidence of any purulence.  The Remedy cement spacer was placed which consisted of a medium stem and large head was placed into the humerus.  Shoulder was reduced.  Two batches of antibiotic beads were placed as well.  The deltopectoral interval was approximated with #5 Mersilene sutures.  The soft tissue was irrigated with a liter of jet pulse lavage.  There was no further purulence.  The wound was then closed with 0 Vicryl figure-of-eight sutures and 2-0 nylon horizontal mattress sutures.  A sterile dressing was applied.  Sling and swathe was applied.  The patient was transferred to the recovery room in stable condition.    Gross purulence was encountered.    A humeral osteotomy was performed to reconstitute the canal distally.    I was able to place an 8 long stem.    The patient will require a second-staged procedure for revision following a course of antibiotics.    CPT CODES:  80208, 59589, and 97041.    ICD-10 CODES:  T84.59X, Z96.612.        RAJINDER CASTLE JR, MD      AGP/AQS  D:  03/06/2025 16:12:13  T:  03/06/2025 22:25:39  JOB #:  [id number]    CC:   Rajinder Castle Jr, MD

## 2025-03-07 NOTE — CONSULTS
[facility] Hospitalist Consult   Admit Date:  3/6/2025 10:39 AM   Name:  Zack Treviño   Age:  69 y.o.  Sex:  male  :  1955   MRN:  [id number]   Room:  312/01    Presenting/Chief Complaint: No chief complaint on file.    Reason(s) for Admission: Prosthetic shoulder infection [T84.59XA, Z96.619]  Presence of left artificial shoulder joint [Z96.612]  Prosthetic shoulder infection, initial encounter [T84.59XA, Z96.619]  Infection of prosthetic total shoulder joint, initial encounter [T84.59XA, Z96.619]     Hospitalists consulted by Rajinder Castle Jr., MD for: medical management    History of Presenting Illness:   Zack Treviño is a 69 y.o. male with history of DM2, prostatic left shoulder who was admitted to Ortho on 3/6 and underwent debridement and removal of left shoulder implant and insertion remedy of antibiotic cement spacer and antibiotic beads of left shoulder on 3/6.  Hospitalist was consulted postop for medical management.    Pt stated post-op pain is tolerable with current pain regimen. Exacerbating factor including movements. He is tolerating po well. He reported compliance to her home medications. Denies any needs at this time. Denies fever, chills, SOB, chest pain, abdominal pain, N/V, diarrhea or constipation.    Assessment & Plan:     Prosthetic Left shoulder infection, initial encounter  S/p debridement and removal of left shoulder implant and insertion remedy of antibiotic cement spacer and antibiotic beads of left shoulder on 3/6  Pain management per Ortho  PT/OT  ABx: Vancomycin/Ancef (3/6-...)  Operative cultures: Pending  ID consulted  Ortho primary--will require 6 weeks antibiotics followed by repeat aspiration prior to second stage revision per Ortho    DM2  HA1C 7.5  Hold home metformin  Start SSI  Diabetes management to assist    HTN  Continue home lisinopril    HLD  Continue home statin    EILEEN  Hemoglobin at baseline >11  Continue home ferrous sulfate  CTM CBC and transfuse as      1. Status post revision of shoulder arthoplasty 2. Soft tissue swelling with question of some erosive change Electronically signed by Crissy Martel        Echocardiogram:  No results found for this or any previous visit.      Current Facility-Administered Medications   Medication Dose Route Frequency    HYDROmorphone HCl PF (DILAUDID) injection 1 mg  1 mg IntraVENous Q2H PRN    docusate sodium (COLACE) capsule 100 mg  100 mg Oral BID    bisacodyl (DULCOLAX) suppository 10 mg  10 mg Rectal Daily PRN    ondansetron (ZOFRAN-ODT) disintegrating tablet 4 mg  4 mg Oral Q8H PRN    promethazine (PHENERGAN) tablet 25 mg  25 mg Oral Q4H PRN    sodium phosphate (FLEET) rectal enema 1 enema  1 enema Rectal Once PRN    ferrous sulfate (IRON 325) tablet 325 mg  325 mg Oral BID WC    sodium chloride flush 0.9 % injection 5-40 mL  5-40 mL IntraVENous 2 times per day    sodium chloride flush 0.9 % injection 5-40 mL  5-40 mL IntraVENous PRN    0.9 % sodium chloride infusion   IntraVENous PRN    oxyCODONE (ROXICODONE) immediate release tablet 10 mg  10 mg Oral Q3H PRN    oxyCODONE (ROXICODONE) immediate release tablet 20 mg  20 mg Oral Q3H PRN    DULoxetine (CYMBALTA) extended release capsule 30 mg  30 mg Oral Daily    lisinopril (PRINIVIL;ZESTRIL) tablet 40 mg  40 mg Oral Daily    pravastatin (PRAVACHOL) tablet 80 mg  80 mg Oral Nightly    ceFAZolin (ANCEF) 1,000 mg in sterile water 10 mL IV syringe  1,000 mg IntraVENous Q8H    vancomycin (VANCOCIN) 750 mg in sodium chloride 0.9 % 250 mL IVPB ([id number])  750 mg IntraVENous Q12H       Signed:  Colette Thomas DO    Part of this note may have been written by using a voice dictation software.  The note has been proof read but may still contain some grammatical/other typographical errors.

## 2025-03-07 NOTE — PROGRESS NOTES
ACUTE PHYSICAL THERAPY GOALS:   (Developed with and agreed upon by patient and/or caregiver.)  GOALS (1-4 days):  (1.)  Patient will move from supine to sit and sit to supine  in bed with CONTACT GUARD ASSIST.    (2.)  Patient will transfer from bed to chair and chair to bed with CONTACT GUARD ASSIST using the least restrictive device.    (3.)  Patient will ambulate with CONTACT GUARD ASSIST for 25 feet with the least restrictive device.   (4.)  Patient will be independent with shoulder HEP to increase range of motion per MD orders.  ________________________________________________________________________________________________      PHYSICAL THERAPY: SHOULDER Daily Note and PM  (Link to Caseload Tracking: PT Visit Days : 1  Acknowledge Orders Time In/Out  PT Charge Capture  Rehab Caseload Tracker    Zack Treviño is a 69 y.o. 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]  Procedure(s) (LRB):  INCISION, DEBRIDEMENT, REMOVAL IMPLANT LEFT SHOULDER \"RTSA\" WITH A HUMERAL OSTEOTOMY  and INSERTION REMEDY ANTIBIOTIC CEMENT SPACER AND ANTIBIOTIC BEADS LEFT SHOULDER (Left)  1 Day Post-Op  Reason for Referral: Pain in Left Shoulder (M25.512)  Stiffness of Left Shoulder, Not elsewhere classified (M25.612)  Other abnormalities of gait and mobility (R26.89)  Hemiplegia and hemiparesis following cerebral infarction affecting left non-dominant side (I69.354)  Inpatient: Payor: [facility] MEDICARE / Plan: [place] DUAL COMPLETE / Product Type: *No Product type* /     MOVEMENT PRECAUTIONS   TWICE A DAY   Elbow, hand, gentle pendulums left shoulder.   NO OTHER MOTION.   Nwb   Sling left shoulder      REHAB RECOMMENDATIONS:   Recommendation to  Surgical Protocols    Distance  15 feet    Gait Quality Decreased dayana , Decreased step clearance, Decreased step length, Decreased stance, and Hemiplegic    DME Gait Belt and SPC     Stairs      I=Independent, Mod I=Modified Independent, S=Supervision, SBA=Standby Assistance, CGA=Contact Guard Assistance,   Min=Minimal Assistance, Mod=Moderate Assistance, Max=Maximal Assistance, Total=Total Assistance, NT=Not Tested    BALANCE: Good Fair+ Fair Fair- Poor NT Comments   Sitting Static [x] [] [] [] [] []    Sitting Dynamic [] [] [x] [] [] []              Standing Static [] [] [x] [] [] [] With cane   Standing Dynamic [] [] [] [x] [] [] With cane     PLAN:   FREQUENCY AND DURATION: BID for duration of hospital stay or until stated goals are met, whichever comes first.    THERAPY PROGNOSIS: Good    PROBLEM LIST:   (Skilled intervention is medically necessary to address:)  Decreased ADL/Functional Activities  Decreased Activity Tolerance  Decreased AROM/PROM  Decreased Balance  Decreased Coordination  Decreased Gait Ability  Decreased Safety Awareness  Decreased Strength  Decreased Transfer Abilities  Increased Pain   INTERVENTIONS PLANNED:   (Benefits and precautions of physical therapy have been discussed with the patient.)  Therapeutic Activity  Therapeutic Exercise/HEP  Neuromuscular Re-education  Gait Training  Education       TREATMENT:     TREATMENT:   Therapeutic Activity (26 Minutes): Therapeutic activity included Supine to Sit, Scooting, Transfer Training, Ambulation on level ground, Sitting balance , Standing balance, and exercises to improve functional Activity tolerance, Balance, Coordination, Mobility, Strength, and ROM.    TREATMENT GRID:   Date:  3/7 Date:   Date:     ACTIVITY/EXERCISE: AM PM AM PM AM PM   Gripping 10aa 10aa       Wrist Flexion/Extension 10p 10aa/p       Wrist Ulnar/Radial Deviation         Pronation/Supination  10aa/p       Elbow Flexion/Extension  10aa/p       Shoulder

## 2025-03-07 NOTE — PROGRESS NOTES
VANCO DAILY FOLLOW UP NOTE  [facility]   Pharmacy Pharmacokinetic Monitoring Service - Vancomycin    Consulting Provider: Dr Castle   Indication: Surgical Site Infection  Target Concentration: Goal AUC/MANUEL 400-600 mg*hr/L  Day of Therapy: 2  Additional Antimicrobials: Ancef    Pertinent Laboratory Values:   Wt Readings from Last 1 Encounters:   03/06/25 77.2 kg (170 lb 4.8 oz)     Temp Readings from Last 1 Encounters:   03/07/25 97.9 °F (36.6 °C) (Oral)     Recent Labs     03/06/25  1641 03/07/25  0335   BUN 14 15   CREATININE 0.74* 0.64*   WBC 13.9* 8.0     Estimated Creatinine Clearance: 108 mL/min (A) (based on SCr of 0.64 mg/dL (L)).    No results found for: \"VANCOTROUGH\", \"VANCORANDOM\"    MRSA Nasal Swab: N/A. Non-respiratory infection    Assessment:  Date/Time Dose Concentration AUC         Note: Serum concentrations collected for AUC dosing may appear elevated if collected in close proximity to the dose administered, this is not necessarily an indication of toxicity    Plan:  No new labs or changes to report today  Continue current dose  Repeat vancomycin concentration ordered for 3/8 @ 0800    Pharmacy will continue to monitor patient and adjust therapy as indicated      Thank you for the consult,    Mari Bruner, PharmD

## 2025-03-07 NOTE — DIABETES MGMT
Patient seen for diabetes education.    Patient speaks [place], interpretering services utilized (Shayy [id number]). All educational materials provided in [place] (for patient) and English (for son).    Patient denied knowing he had a history of diabetes or that he was on Metformin but voices extensive family history of diabetes.    Patient given educational material, \"Diabetes Self-Management: A Patient Teaching Guide\", which was reviewed with patient. Explained basic physiology of diabetes, as well as causes, signs and symptoms, and treatments for hypoglycemia and hyperglycemia. Described the effects of poor glycemic control and the development of long-term complications such as renal, eye, nerve, and cardiovascular disease. Patient denies smoking states he quit over 20 years ago. Continued cessation encouraged. Described proper diabetic foot care and the importance of checking feet daily. Per patient they typically drink coffee with sugar/milk and regular soda. Reviewed effects of sweetened beverages on glycemic control and discussed alternative beverages to help improve glycemic control. Patient voices that they rarely drink alcoholic beverages. Educated on ADA recommendations regarding alcohol and diabetes. Per patient they like bread, rice, potatoes. Educated re: effects of carbohydrates on blood glucose, the \"plate method\" of healthy meal planning, basics of healthy meal plan, Consistent Carbohydrate Diet. Patient had 2 regular pepsis at bedside but requested cup of water at this time and pepsi moved to table in room. Educated patient regarding the benefits of physical activity (as cleared by provider) on glycemic control. Also explained the relationship between hyperglycemia and infection and delayed healing. Discussed target goals for blood glucose and A1C. Current A1c 7.5% Educated patient regarding diabetic medications including mechanism of action, timing, and possible side effects. Patient verbalizes  understanding of teaching.    Explained the benefits of blood glucose monitoring to patient and how this will provide their provider with more information to help them safely titrate their regimen. Discussed benefits of checking glucose at varying times of day. Educated regarding CGMs. Encouraged use of log book and follow up with provider. Demonstrated how to use a blood glucose meter, care of strips, and sharps disposal. Patient did not return demonstrate use at this time due to recent shoulder surgery. Patient will likely benefit from prescription for glucometer kit, test strips, and lancets at discharge so that the patient may obtain the meter covered by their insurance.    Patient received steroid therapy yesterday. Explained relationship between steroids and hyperglycemia to patient.    Patient would likely benefit from continued diabetes outpatient education. Patient provided with contact information to [facility] program to pursue at their convenience after discharge at no cost.     Encouraged compliance with discharge regimen. Encouraged patient to continue to work on lifestyle modifications and to follow up with primary care provider for further titration of regimen. Patient verbalized understanding and voices no further questions regarding diabetes management at this time.

## 2025-03-07 NOTE — DIABETES MGMT
Diabetes education assessment.    Noted per chart review patient has been confused at times during admission. Per staff, patient son states patient has some baseline confusion at home. First diabetes education assessment completed with patient son.    Per patient son patient has been living with prediabetes for many years. Noted patient has type 2 diabetes listed on chart since 2021. Patient was prescribed Metformin 500mg daily however per son patient likely has been missing doses. Patient son assists with patient's pain medications due to patient's potential to inadvertently take double dosing due to forgetting that he has already taken it. Discussed benefits of a pill box. Educated regarding diabetes medications. Per son patient typically drinks 7 cups of coffee with 2 tbsp sugar and milk each day, a regular soda with lunch or dinner, a sweet tea when eating out. Reviewed effects of sweetened beverages on glycemic control and alternative options. Patient eats 1-2 main meals a day and snacks. Snacks include bread and cheese bread. Reviewed basics of plate method and effects of carbohydrates on glycemic control. Patient son questioning regarding if patient will go to rehab encourage to discuss with case management. Per son he works so patient is at home alone a good bit of the day. Reviewed target blood glucose goals. A1c 7.5.%. Reviewed importance of good glycemic control on wound healing. Reviewed relationship between infection and hyperglycemia. Encouraged compliance with discharge regimen. Encouraged lifestyle modifications and to follow up with primary care provider for further titration of regimen. Patient son reads English, patient reads Italian but recently broke his glasses. Will plan for educational materials in Italian and English.

## 2025-03-07 NOTE — PROGRESS NOTES
ACUTE PHYSICAL THERAPY GOALS:   (Developed with and agreed upon by patient and/or caregiver.)  GOALS (1-4 days):  (1.)  Patient will move from supine to sit and sit to supine  in bed with CONTACT GUARD ASSIST.    (2.)  Patient will transfer from bed to chair and chair to bed with CONTACT GUARD ASSIST using the least restrictive device.    (3.)  Patient will ambulate with CONTACT GUARD ASSIST for 25 feet with the least restrictive device.   (4.)  Patient will be independent with shoulder HEP to increase range of motion per MD orders.  ________________________________________________________________________________________________      PHYSICAL THERAPY: SHOULDER Initial Assessment and AM  (Link to Caseload Tracking: PT Visit Days : 1  Acknowledge Orders Time In/Out  PT Charge Capture  Rehab Caseload Tracker    Zack Treviño is a 69 y.o. 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]  Procedure(s) (LRB):  INCISION, DEBRIDEMENT, REMOVAL IMPLANT LEFT SHOULDER \"RTSA\" WITH A HUMERAL OSTEOTOMY  and INSERTION REMEDY ANTIBIOTIC CEMENT SPACER AND ANTIBIOTIC BEADS LEFT SHOULDER (Left)  1 Day Post-Op  Reason for Referral: Pain in Left Shoulder (M25.512)  Stiffness of Left Shoulder, Not elsewhere classified (M25.612)  Other abnormalities of gait and mobility (R26.89)  Hemiplegia and hemiparesis following cerebral infarction affecting left non-dominant side (I69.354)  Inpatient: Payor: [facility] MEDICARE / Plan: [employer] DUAL COMPLETE / Product Type: *No Product type* /     MOVEMENT PRECAUTIONS   TWICE A DAY   Elbow, hand, gentle pendulums left shoulder.   NO OTHER MOTION.   Nwb   Sling left shoulder      REHAB RECOMMENDATIONS:    Orientation []  Seems intact, speaks Malawian   Vision []     Hearing []     Cognition  []       MOBILITY: I Mod I S SBA CGA Min Mod Max Total  NT x2 Comments:   Bed Mobility    Rolling [] [] [] [] [] [] [] [] [] [] []    Supine to Sit [] [] [] [] [] [] [x] [] [] [] []    Scooting [] [] [] [] [] [] [x] [] [] [] []    Sit to Supine [] [] [] [] [] [] [] [] [] [] []    Transfers    Sit to Stand [] [] [] [] [] [] [x] [] [] [] []    Bed to Chair [] [] [] [] [] [] [x] [] [] [] []    Stand to Sit [] [] [] [] [] [] [x] [] [] [] []    Stand Pivot [] [] [] [] [] [] [] [] [] [] []    Toilet [] [] [] [] [] [] [] [] [] [] []     [] [] [] [] [] [] [] [] [] [] []    I=Independent, Mod I=Modified Independent, S=Supervision, SBA=Standby Assistance, CGA=Contact Guard Assistance,   Min=Minimal Assistance, Mod=Moderate Assistance, Max=Maximal Assistance, Total=Total Assistance, NT=Not Tested    GAIT: I Mod I S SBA CGA Min Mod Max Total  NT x2 Comments:   Level of Assistance [] [] [] [] [] [] [x] [] [] [] []    Weightbearing Status  Restrictions/Precautions  Restrictions/Precautions: Weight Bearing, ROM Restrictions, Surgical Protocols    Distance  3 steps     Gait Quality Decreased dayana , Decreased step clearance, Decreased step length, Decreased stance, and Hemiplegic    DME Hand held assist right UE      Stairs      I=Independent, Mod I=Modified Independent, S=Supervision, SBA=Standby Assistance, CGA=Contact Guard Assistance,   Min=Minimal Assistance, Mod=Moderate Assistance, Max=Maximal Assistance, Total=Total Assistance, NT=Not Tested    BALANCE: Good Fair+ Fair Fair- Poor NT Comments   Sitting Static [x] [] [] [] [] []    Sitting Dynamic [] [] [x] [] [] []              Standing Static [] [] [x] [] [] []    Standing Dynamic [] [] [] [x] [] []      PLAN:   FREQUENCY AND DURATION: BID for duration of hospital stay or until stated goals are met, whichever comes first.    THERAPY PROGNOSIS: Good    PROBLEM LIST:   (Skilled  notified    INTERDISCIPLINARY COLLABORATION:  RN/ PCT and OT/ CURTIS    COMPLIANCE WITH PROGRAM/EXERCISES: Will assess as treatment progresses.    RECOMMENDATIONS/INTENT FOR NEXT TREATMENT SESSION: Treatment next visit will focus on increasing Mr. Treviño's independence with bed mobility, transfers, gait training, strength/ROM exercises, modalities for pain, and patient education.     TIME IN/OUT:  Time In: 1030  Time Out: 1105  Minutes: 35    PAULO GUZMAN, PT

## 2025-03-07 NOTE — PROGRESS NOTES
OCCUPATIONAL THERAPY Initial Assessment and Daily Note      (Link to Caseload Tracking: OT Visit Days: 1  OT Orders   Time  OT Charge Capture  Rehab Caseload Tracker  Episode     Zack Treviño is a 69 y.o. 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]  Procedure(s) (LRB):  INCISION, DEBRIDEMENT, REMOVAL IMPLANT LEFT SHOULDER \"RTSA\" WITH A HUMERAL OSTEOTOMY  and INSERTION REMEDY ANTIBIOTIC CEMENT SPACER AND ANTIBIOTIC BEADS LEFT SHOULDER (Left)  1 Day Post-Op  Reason for Referral: Pain in Left Shoulder (M25.512)  Stiffness of Left Shoulder, Not elsewhere classified (M25.612)  Inpatient: Payor: [facility] MEDICARE / Plan: [employer] DUAL COMPLETE / Product Type: *No Product type* /     ASSESSMENT:     REHAB RECOMMENDATIONS:   Recommendation to date pending progress:  Setting:  Short-term Rehab    Equipment:    To Be Determined     ASSESSMENT:  Mr. Treviño is s/p INCISION, DEBRIDEMENT, REMOVAL IMPLANT LEFT SHOULDER \"RTSA\" WITH A HUMERAL OSTEOTOMY  and INSERTION REMEDY ANTIBIOTIC CEMENT SPACER AND ANTIBIOTIC BEADS LEFT SHOULDER (Left) . He presents with decreased independence with functional mobility and activities of daily living as compared to baseline level of function and safety. Patient would benefit from skilled Occupational Therapy to maximize independence and safety with self-care task and functional mobility.   Patient lives with his son who in/out of the house during the day. He had a previous CVA with L sided deficits- he has very minimal active movement of left UE. Patient is Tunisian speaking,  used for duration of session. Patient needed max assistance for donning gown and adjusting sling. He completed supine

## 2025-03-07 NOTE — CARE COORDINATION
MD order rec'd for short term rehab and long term IVAB. Pt is s/p Incision, debridement, removal of implant left shoulder, reverse left total shoulder arthroplasty. Pt lives with son who works full-time.Ignacio works in transport [place]. Patient normally independent with care- likes to drive self shopping. PT already recommending rehab due to extreme weakness. ID consulted- cultures pending.  Pt speaks only American. I met with the son at his request who speaks English. I explained the process for rehab and possible need for long term IVAB. Pt has hx of [facility] 10 yrs ago. They are interested in [facility] again- I gave area NH list with ratings. Son is receptive towards rehab then completing home infusion if needed. We will go ahead and send referrals to [facility] and [facility] while we wait on cultures.Anticipate pt will be here through weekend. Precert will be required     03/07/25 2785   Service Assessment   Patient Orientation Alert and Oriented   Cognition Alert   History Provided By Patient;Child/Family;Medical Record   Primary Caregiver Self   Accompanied By/Relationship son   Support Systems Children   Patient's Healthcare Decision Maker is: Legal Next of Kin   PCP Verified by CM No   Prior Functional Level Independent in ADLs/IADLs   Current Functional Level Assistance with the following:;Cooking;Housework;Shopping   Can patient return to prior living arrangement Unknown at present   Ability to make needs known: Fair   Family able to assist with home care needs: No   Would you like for me to discuss the discharge plan with any other family members/significant others, and if so, who? Yes  (son)   Financial Resources Medicare   Community Resources None   CM/SW Referral ADLs/IADLs   Services At/After Discharge   Transition of Care Consult (CM Consult) SNF;Discharge Planning   Partner SNF Yes   Services At/After Discharge IV Therapy;Skilled Nursing Facility (SNF)   Mode of Transport at  Discharge BLS   Confirm Follow Up Transport Self   Condition of Participation: Discharge Planning   The Plan for Transition of Care is related to the following treatment goals: improve independence and possible treatment for infection   The Patient and/or Patient Representative was provided with a Choice of Provider? Patient   The Patient and/Or Patient Representative agree with the Discharge Plan? Yes   Freedom of Choice list was provided with basic dialogue that supports the patient's individualized plan of care/goals, treatment preferences, and shares the quality data associated with the providers?  Yes

## 2025-03-07 NOTE — CONSULTS
Session ID: [id number]  Language: Vietnamese   ID: #[id number]   Name: Tanyauage: Vietnamese   ID: #[id number]   Name: Nelson

## 2025-03-08 LAB
ANION GAP SERPL CALC-SCNC: 13 MMOL/L (ref 7–16)
BUN SERPL-MCNC: 18 MG/DL (ref 8–23)
CALCIUM SERPL-MCNC: 10.4 MG/DL (ref 8.8–10.2)
CHLORIDE SERPL-SCNC: 98 MMOL/L (ref 98–107)
CO2 SERPL-SCNC: 25 MMOL/L (ref 20–29)
CREAT SERPL-MCNC: 0.75 MG/DL (ref 0.8–1.3)
ERYTHROCYTE [DISTWIDTH] IN BLOOD BY AUTOMATED COUNT: 12 % (ref 11.9–14.6)
GLUCOSE BLD STRIP.AUTO-MCNC: 175 MG/DL (ref 65–100)
GLUCOSE BLD STRIP.AUTO-MCNC: 177 MG/DL (ref 65–100)
GLUCOSE BLD STRIP.AUTO-MCNC: 192 MG/DL (ref 65–100)
GLUCOSE BLD STRIP.AUTO-MCNC: 208 MG/DL (ref 65–100)
GLUCOSE SERPL-MCNC: 179 MG/DL (ref 70–99)
HCT VFR BLD AUTO: 27.4 % (ref 41.1–50.3)
HGB BLD-MCNC: 8.8 G/DL (ref 13.6–17.2)
MAGNESIUM SERPL-MCNC: 1.5 MG/DL (ref 1.8–2.4)
MCH RBC QN AUTO: 27.7 PG (ref 26.1–32.9)
MCHC RBC AUTO-ENTMCNC: 32.1 G/DL (ref 31.4–35)
MCV RBC AUTO: 86.2 FL (ref 82–102)
NRBC # BLD: 0 K/UL (ref 0–0.2)
PLATELET # BLD AUTO: 285 K/UL (ref 150–450)
PMV BLD AUTO: 9.3 FL (ref 9.4–12.3)
POTASSIUM SERPL-SCNC: 3.7 MMOL/L (ref 3.5–5.1)
RBC # BLD AUTO: 3.18 M/UL (ref 4.23–5.6)
SERVICE CMNT-IMP: ABNORMAL
SODIUM SERPL-SCNC: 136 MMOL/L (ref 136–145)
VANCOMYCIN SERPL-MCNC: 12.9 UG/ML
WBC # BLD AUTO: 8 K/UL (ref 4.3–11.1)

## 2025-03-08 PROCEDURE — 80202 ASSAY OF VANCOMYCIN: CPT

## 2025-03-08 PROCEDURE — 6370000000 HC RX 637 (ALT 250 FOR IP): Performed by: ORTHOPAEDIC SURGERY

## 2025-03-08 PROCEDURE — 36415 COLL VENOUS BLD VENIPUNCTURE: CPT

## 2025-03-08 PROCEDURE — 85027 COMPLETE CBC AUTOMATED: CPT

## 2025-03-08 PROCEDURE — 6360000002 HC RX W HCPCS: Performed by: ORTHOPAEDIC SURGERY

## 2025-03-08 PROCEDURE — 80048 BASIC METABOLIC PNL TOTAL CA: CPT

## 2025-03-08 PROCEDURE — 97530 THERAPEUTIC ACTIVITIES: CPT

## 2025-03-08 PROCEDURE — 2580000003 HC RX 258: Performed by: ORTHOPAEDIC SURGERY

## 2025-03-08 PROCEDURE — 1100000000 HC RM PRIVATE

## 2025-03-08 PROCEDURE — 6370000000 HC RX 637 (ALT 250 FOR IP): Performed by: PHYSICIAN ASSISTANT

## 2025-03-08 PROCEDURE — 6370000000 HC RX 637 (ALT 250 FOR IP): Performed by: FAMILY MEDICINE

## 2025-03-08 PROCEDURE — 82962 GLUCOSE BLOOD TEST: CPT

## 2025-03-08 PROCEDURE — 83735 ASSAY OF MAGNESIUM: CPT

## 2025-03-08 PROCEDURE — 2500000003 HC RX 250 WO HCPCS: Performed by: ORTHOPAEDIC SURGERY

## 2025-03-08 RX ORDER — INSULIN GLARGINE 100 [IU]/ML
15 INJECTION, SOLUTION SUBCUTANEOUS DAILY
Status: DISCONTINUED | OUTPATIENT
Start: 2025-03-09 | End: 2025-03-11 | Stop reason: HOSPADM

## 2025-03-08 RX ORDER — ACETAMINOPHEN 500 MG
500 TABLET ORAL EVERY 4 HOURS PRN
Status: DISCONTINUED | OUTPATIENT
Start: 2025-03-08 | End: 2025-03-11 | Stop reason: HOSPADM

## 2025-03-08 RX ORDER — HYDROMORPHONE HYDROCHLORIDE 2 MG/1
2 TABLET ORAL EVERY 6 HOURS PRN
Refills: 0 | Status: DISCONTINUED | OUTPATIENT
Start: 2025-03-08 | End: 2025-03-11 | Stop reason: HOSPADM

## 2025-03-08 RX ORDER — INSULIN GLARGINE 100 [IU]/ML
10 INJECTION, SOLUTION SUBCUTANEOUS DAILY
Status: DISCONTINUED | OUTPATIENT
Start: 2025-03-08 | End: 2025-03-08

## 2025-03-08 RX ORDER — INSULIN LISPRO 100 [IU]/ML
2 INJECTION, SOLUTION INTRAVENOUS; SUBCUTANEOUS
Status: DISCONTINUED | OUTPATIENT
Start: 2025-03-08 | End: 2025-03-11 | Stop reason: HOSPADM

## 2025-03-08 RX ORDER — TRAMADOL HYDROCHLORIDE 50 MG/1
50 TABLET ORAL EVERY 6 HOURS PRN
Status: DISCONTINUED | OUTPATIENT
Start: 2025-03-08 | End: 2025-03-08

## 2025-03-08 RX ADMIN — DOCUSATE SODIUM 100 MG: 100 CAPSULE, LIQUID FILLED ORAL at 20:15

## 2025-03-08 RX ADMIN — HYDROMORPHONE HYDROCHLORIDE 2 MG: 2 TABLET ORAL at 20:14

## 2025-03-08 RX ADMIN — FERROUS SULFATE TAB 325 MG (65 MG ELEMENTAL FE) 325 MG: 325 (65 FE) TAB at 09:06

## 2025-03-08 RX ADMIN — LISINOPRIL 40 MG: 20 TABLET ORAL at 09:05

## 2025-03-08 RX ADMIN — DULOXETINE HYDROCHLORIDE 30 MG: 30 CAPSULE, DELAYED RELEASE ORAL at 09:06

## 2025-03-08 RX ADMIN — PRAVASTATIN SODIUM 80 MG: 80 TABLET ORAL at 20:14

## 2025-03-08 RX ADMIN — ACETAMINOPHEN 500 MG: 500 TABLET, FILM COATED ORAL at 12:30

## 2025-03-08 RX ADMIN — INSULIN GLARGINE 10 UNITS: 100 INJECTION, SOLUTION SUBCUTANEOUS at 09:08

## 2025-03-08 RX ADMIN — WATER 1000 MG: 1 INJECTION INTRAMUSCULAR; INTRAVENOUS; SUBCUTANEOUS at 14:50

## 2025-03-08 RX ADMIN — DOCUSATE SODIUM 100 MG: 100 CAPSULE, LIQUID FILLED ORAL at 09:06

## 2025-03-08 RX ADMIN — INSULIN LISPRO 1 UNITS: 100 INJECTION, SOLUTION INTRAVENOUS; SUBCUTANEOUS at 12:31

## 2025-03-08 RX ADMIN — INSULIN LISPRO 1 UNITS: 100 INJECTION, SOLUTION INTRAVENOUS; SUBCUTANEOUS at 09:08

## 2025-03-08 RX ADMIN — VANCOMYCIN HYDROCHLORIDE 1000 MG: 1 INJECTION, POWDER, LYOPHILIZED, FOR SOLUTION INTRAVENOUS at 12:38

## 2025-03-08 RX ADMIN — VANCOMYCIN HYDROCHLORIDE 1000 MG: 1 INJECTION, POWDER, LYOPHILIZED, FOR SOLUTION INTRAVENOUS at 23:39

## 2025-03-08 RX ADMIN — HYDROMORPHONE HYDROCHLORIDE 2 MG: 2 TABLET ORAL at 14:47

## 2025-03-08 RX ADMIN — ONDANSETRON 4 MG: 4 TABLET, ORALLY DISINTEGRATING ORAL at 07:18

## 2025-03-08 RX ADMIN — VANCOMYCIN HYDROCHLORIDE 750 MG: 750 INJECTION, POWDER, LYOPHILIZED, FOR SOLUTION INTRAVENOUS at 02:41

## 2025-03-08 RX ADMIN — Medication 6 MG: at 20:14

## 2025-03-08 RX ADMIN — WATER 1000 MG: 1 INJECTION INTRAMUSCULAR; INTRAVENOUS; SUBCUTANEOUS at 05:24

## 2025-03-08 RX ADMIN — TRAMADOL HYDROCHLORIDE 50 MG: 50 TABLET ORAL at 09:05

## 2025-03-08 RX ADMIN — WATER 1000 MG: 1 INJECTION INTRAMUSCULAR; INTRAVENOUS; SUBCUTANEOUS at 23:26

## 2025-03-08 ASSESSMENT — PAIN SCALES - GENERAL
PAINLEVEL_OUTOF10: 6
PAINLEVEL_OUTOF10: 2
PAINLEVEL_OUTOF10: 4
PAINLEVEL_OUTOF10: 3
PAINLEVEL_OUTOF10: 6

## 2025-03-08 ASSESSMENT — PAIN DESCRIPTION - LOCATION
LOCATION: SHOULDER
LOCATION: SHOULDER

## 2025-03-08 ASSESSMENT — PAIN DESCRIPTION - ORIENTATION
ORIENTATION: LEFT
ORIENTATION: LEFT

## 2025-03-08 ASSESSMENT — PAIN DESCRIPTION - DESCRIPTORS
DESCRIPTORS: THROBBING
DESCRIPTORS: THROBBING

## 2025-03-08 NOTE — PROGRESS NOTES
Hospitalist Progress Note   Admit Date:  3/6/2025 10:39 AM   Name:  Zack Treviño   Age:  69 y.o.  Sex:  male  :  1955   MRN:  [id number]   Room:  312/01    Presenting/Chief Complaint: No chief complaint on file.     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 male with history of DM2, prostatic left shoulder who was admitted to Ortho on 3/6 and underwent debridement and removal of left shoulder implant and insertion remedy of antibiotic cement spacer and antibiotic beads of left shoulder on 3/6.  Hospitalist was consulted postop for medical management.       Subjective & 24hr Events:     Patient sitting up in recliner.  Daughter bedside.  Patient feeling better after recent dosage of pain medication.  Asked something to help him sleep tonight.  No fever or chills or SOB or chest pain.      Assessment & Plan:     Prosthetic Left shoulder infection, initial encounter  S/p debridement and removal of left shoulder implant and insertion remedy of antibiotic cement spacer and antibiotic beads of left shoulder on 3/6  Pain management per Ortho  PT/OT  ABx: Vancomycin/Ancef (3/6-...)  Operative cultures: NGTD thus far  ID consulted  Ortho primary--will require 6 weeks antibiotics followed by repeat aspiration prior to second stage revision per Ortho     DM2  HA1C 7.5  Hold home metformin  Cont Lantus--increase to 15U qd  Cont SSI  Add premeal Lispro 2UTID  Diabetes management to assist     HTN  Continue home lisinopril     HLD  Continue home statin     EILEEN  Hemoglobin at baseline >11  Continue home ferrous sulfate  CTM CBC and transfuse as needed     Anxiety/depression  Continue home Cymbalta      Anticipated Discharge Arrangements:   Defer to  written by using a voice dictation software.  The note has been proof read but may still contain some grammatical/other typographical errors.

## 2025-03-08 NOTE — PROGRESS NOTES
VANCO DAILY FOLLOW UP NOTE  [facility]   Pharmacy Pharmacokinetic Monitoring Service - Vancomycin    Consulting Provider: Dr Castle   Indication: Surgical Site Infection  Target Concentration: Goal AUC/MANUEL 400-600 mg*hr/L  Day of Therapy: 3  Additional Antimicrobials: Ancef    Pertinent Laboratory Values:   Wt Readings from Last 1 Encounters:   03/06/25 77.2 kg (170 lb 4.8 oz)     Temp Readings from Last 1 Encounters:   03/08/25 97.9 °F (36.6 °C) (Oral)     Recent Labs     03/06/25  1641 03/07/25  0335 03/08/25  0809   BUN 14 15 18   CREATININE 0.74* 0.64* 0.75*   WBC 13.9* 8.0 8.0     Estimated Creatinine Clearance: 92 mL/min (A) (based on SCr of 0.75 mg/dL (L)).    Lab Results   Component Value Date/Time    VANCORANDOM 12.9 03/08/2025 08:09 AM       MRSA Nasal Swab: N/A. Non-respiratory infection    Assessment:  Date/Time Dose Concentration AUC   3/8/25  0809 750 mg q12h 12.9 350   Note: Serum concentrations collected for AUC dosing may appear elevated if collected in close proximity to the dose administered, this is not necessarily an indication of toxicity    Plan:  Current dosing regimen is sub-therapeutic  Increase dose to 1000 mg q12h for predicted AUC/Tr of 444/14.2  Repeat vancomycin concentration ordered for 3/9 @ 0600    Pharmacy will continue to monitor patient and adjust therapy as indicated      Thank you for the consult,    Mari Bruner, GeraldD

## 2025-03-08 NOTE — PLAN OF CARE
Problem: Pain  Goal: Verbalizes/displays adequate comfort level or baseline comfort level  3/7/2025 2147 by Keturah Lee, RN  Outcome: Progressing  3/7/2025 0846 by Sagrario Marcial, RN  Outcome: Progressing     Problem: Safety - Adult  Goal: Free from fall injury  Outcome: Progressing     Problem: Chronic Conditions and Co-morbidities  Goal: Patient's chronic conditions and co-morbidity symptoms are monitored and maintained or improved  Outcome: Progressing     Problem: Discharge Planning  Goal: Discharge to home or other facility with appropriate resources  Outcome: Progressing

## 2025-03-08 NOTE — PROGRESS NOTES
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  He is unable to move his left hand at this time.  PT/OT:                             Weight Bearing Status: Nonweightbearing left upper extremity  BMP:  Recent Labs     25  1641 25  03325  0809   BUN 14 15 18    134* 136   K 4.6 3.8 3.7    98 98   CO2  25     Patient Vitals for the past 8 hrs:   BP Temp Temp src Pulse Resp SpO2   25 0905 -- -- -- -- 17 --   25 0711 (!) 161/96 97.9 °F (36.6 °C) Oral 88 16 94 %     Temp (24hrs), Av.2 °F (36.8 °C), Min:97.9 °F (36.6 °C), Max:98.4 °F (36.9 °C)    CBC:  Recent Labs     25  1641 25  03325  0809   WBC 13.9* 8.0 8.0   HGB 10.8* 9.1* 8.8*   HCT 33.9* 28.2* 27.4*    294 285       Microbiology:     Recent Results (from the past 72 hours)   TYPE AND SCREEN    Collection Time: 25 11:51 AM   Result Value Ref Range    Crossmatch expiration date 2025,4950     ABO/Rh A POSITIVE     Antibody Screen NEG    Hemoglobin A1C    Collection Time: 25 11:51 AM   Result Value Ref Range    Hemoglobin A1C 7.5 (H) 0 - 5.6 %    Estimated Avg Glucose 169 mg/dL   C-Reactive Protein    Collection Time: 25 11:51 AM   Result Value Ref Range    CRP 12.1 (H) 0.0 - 0.4 mg/dL   Sedimentation Rate    Collection Time: 25 11:51 AM   Result Value Ref Range    Sed Rate, Automated 52 (H) 15 mm/hr   Vitamin D 25 Hydroxy    Collection Time: 25 11:51 AM   Result Value Ref Range    Vit D, 25-Hydroxy 18.1 (L) 30.0 - 100.0 ng/mL  Platelets 294 150 - 450 K/uL    MPV 9.6 9.4 - 12.3 FL    nRBC 0.00 0.0 - 0.2 K/uL   POCT Glucose    Collection Time: 03/07/25  8:21 PM   Result Value Ref Range    POC Glucose 203 (H) 65 - 100 mg/dL    Performed by: SabaSCOTT    POCT Glucose    Collection Time: 03/08/25  7:16 AM   Result Value Ref Range    POC Glucose 192 (H) 65 - 100 mg/dL    Performed by: PrimitivoSCOTT    Basic Metabolic Panel    Collection Time: 03/08/25  8:09 AM   Result Value Ref Range    Sodium 136 136 - 145 mmol/L    Potassium 3.7 3.5 - 5.1 mmol/L    Chloride 98 98 - 107 mmol/L    CO2 25 20 - 29 mmol/L    Anion Gap 13 7 - 16 mmol/L    Glucose 179 (H) 70 - 99 mg/dL    BUN 18 8 - 23 MG/DL    Creatinine 0.75 (L) 0.80 - 1.30 MG/DL    Est, Glom Filt Rate >90 >60 ml/min/1.73m2    Calcium 10.4 (H) 8.8 - 10.2 MG/DL   CBC    Collection Time: 03/08/25  8:09 AM   Result Value Ref Range    WBC 8.0 4.3 - 11.1 K/uL    RBC 3.18 (L) 4.23 - 5.6 M/uL    Hemoglobin 8.8 (L) 13.6 - 17.2 g/dL    Hematocrit 27.4 (L) 41.1 - 50.3 %    MCV 86.2 82.0 - 102.0 FL    MCH 27.7 26.1 - 32.9 PG    MCHC 32.1 31.4 - 35.0 g/dL    RDW 12.0 11.9 - 14.6 %    Platelets 285 150 - 450 K/uL    MPV 9.3 (L) 9.4 - 12.3 FL    nRBC 0.00 0.0 - 0.2 K/uL   Magnesium    Collection Time: 03/08/25  8:09 AM   Result Value Ref Range    Magnesium 1.5 (L) 1.8 - 2.4 mg/dL   Vancomycin Level, Random    Collection Time: 03/08/25  8:09 AM   Result Value Ref Range    Vancomycin Rm 12.9 UG/ML       Objective: Vital Signs are Stable, No Acute Distress, Alert and Oriented  Dressing is mildly saturated with serosanguineous discharge,  Neurovascular exam shows patient unable to move AIN, PIN or intrinsics.  Decree sensation to light touch.  Based on review of notes this could be secondary to poststroke deformity, block and possible radial nerve issue secondary to surgery.    Assessment:  Patient Active Problem List   Diagnosis    Hypomagnesemia    Hypertension    History of CVA (cerebrovascular
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
## 2025-03-08 NOTE — PROGRESS NOTES
ACUTE PHYSICAL THERAPY GOALS:   (Developed with and agreed upon by patient and/or caregiver.)  GOALS (1-4 days):  (1.)  Patient will move from supine to sit and sit to supine  in bed with CONTACT GUARD ASSIST.    (2.)  Patient will transfer from bed to chair and chair to bed with CONTACT GUARD ASSIST using the least restrictive device.    (3.)  Patient will ambulate with CONTACT GUARD ASSIST for 25 feet with the least restrictive device.  (4.)  Patient will be independent with shoulder HEP to increase range of motion per MD orders.  ________________________________________________________________________________________________      PHYSICAL THERAPY: SHOULDER Daily Note and AM  (Link to Caseload Tracking: PT Visit Days : 2  Acknowledge Orders Time In/Out  PT Charge Capture  Rehab Caseload Tracker    Zack Treviño is a 69 y.o. 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]  Procedure(s) (LRB):  INCISION, DEBRIDEMENT, REMOVAL IMPLANT LEFT SHOULDER \"RTSA\" WITH A HUMERAL OSTEOTOMY  and INSERTION REMEDY ANTIBIOTIC CEMENT SPACER AND ANTIBIOTIC BEADS LEFT SHOULDER (Left)  2 Days Post-Op  Reason for Referral: Pain in Left Shoulder (M25.512)  Stiffness of Left Shoulder, Not elsewhere classified (M25.612)  Other abnormalities of gait and mobility (R26.89)  Hemiplegia and hemiparesis following cerebral infarction affecting left non-dominant side (I69.354)  Inpatient: Payor: [facility] MEDICARE / Plan: [employer] DUAL COMPLETE / Product Type: *No Product type* /     MOVEMENT PRECAUTIONS   TWICE A DAY   Elbow, hand, gentle pendulums left shoulder.   NO OTHER MOTION.   Nwb   Sling left shoulder      REHAB RECOMMENDATIONS:   Recommendation to  Exercise/HEP  Neuromuscular Re-education  Gait Training  Education       TREATMENT:     TREATMENT:   Therapeutic Activity (30 Minutes): Therapeutic activity included Transfer Training, Ambulation on level ground, Sitting balance , Standing balance, and exercises as below to improve functional Activity tolerance, Balance, Coordination, Mobility, Strength, and ROM.    TREATMENT GRID:   Date:  3/7 Date:  3/8 Date:     ACTIVITY/EXERCISE: AM PM AM PM AM PM   Gripping 10aa 10aa 10      Wrist Flexion/Extension 10p 10aa/p 10 AA/P      Wrist Ulnar/Radial Deviation         Pronation/Supination  10aa/p 10 AA/P      Elbow Flexion/Extension  10aa/p 10 AA/P      Shoulder Flexion/Extension         Shoulder AB/ADduction         Shoulder IR/ER         Pulleys         Pendulums 30 sec gentle hang        Shrugs         ISOMETRIC:                          Flexion         Extension         ABduction         ADduction         Biceps/Triceps         B = bilateral; AA = active assistive; A = active; P = passive      Educated patient and/or family/caregiver on the following: Movement Precautions, Sling Application, and Use of Ice    EDUCATION: Education Given To: Patient  Education Provided: Role of Therapy;Plan of Care;Home Exercise Program  Education Method: Verbal  Education Outcome: Verbalized understanding;Continued education needed    AFTER TREATMENT PRECAUTIONS: Bed/Chair Locked, Call light within reach, Chair, Needs within reach, and Visitors at bedside    INTERDISCIPLINARY COLLABORATION:  RN/ PCT    COMPLIANCE WITH PROGRAM/EXERCISES: Will assess as treatment progresses.    RECOMMENDATIONS/INTENT FOR NEXT TREATMENT SESSION: Treatment next visit will focus on increasing Mr. Fords independence with bed mobility, transfers, gait training, strength/ROM exercises, modalities for pain, and patient education.     TIME IN/OUT:  Time In: 1135  Time Out: 1205  Minutes: 30    Elenita Rojas PT

## 2025-03-08 NOTE — PROGRESS NOTES
PT note:  Returned in afternoon to place forearm/wrist splint.  Patient back in bed and RN working on pain meds.  Patient denied discomfort from the splint.  Patient, daughter, and RN notified that splint can be removed if causes discomfort.  Will continue therapy tomorrow. Waiting on rehab.      Elenita Rojas, PT

## 2025-03-09 LAB
ANION GAP SERPL CALC-SCNC: 10 MMOL/L (ref 7–16)
BUN SERPL-MCNC: 16 MG/DL (ref 8–23)
CALCIUM SERPL-MCNC: 9.7 MG/DL (ref 8.8–10.2)
CHLORIDE SERPL-SCNC: 99 MMOL/L (ref 98–107)
CO2 SERPL-SCNC: 28 MMOL/L (ref 20–29)
CREAT SERPL-MCNC: 0.67 MG/DL (ref 0.8–1.3)
ERYTHROCYTE [DISTWIDTH] IN BLOOD BY AUTOMATED COUNT: 12.1 % (ref 11.9–14.6)
GLUCOSE BLD STRIP.AUTO-MCNC: 154 MG/DL (ref 65–100)
GLUCOSE BLD STRIP.AUTO-MCNC: 179 MG/DL (ref 65–100)
GLUCOSE BLD STRIP.AUTO-MCNC: 184 MG/DL (ref 65–100)
GLUCOSE BLD STRIP.AUTO-MCNC: 197 MG/DL (ref 65–100)
GLUCOSE SERPL-MCNC: 180 MG/DL (ref 70–99)
HCT VFR BLD AUTO: 25.7 % (ref 41.1–50.3)
HGB BLD-MCNC: 8.4 G/DL (ref 13.6–17.2)
MAGNESIUM SERPL-MCNC: 1.2 MG/DL (ref 1.8–2.4)
MCH RBC QN AUTO: 27.6 PG (ref 26.1–32.9)
MCHC RBC AUTO-ENTMCNC: 32.7 G/DL (ref 31.4–35)
MCV RBC AUTO: 84.5 FL (ref 82–102)
NRBC # BLD: 0 K/UL (ref 0–0.2)
PLATELET # BLD AUTO: 256 K/UL (ref 150–450)
PMV BLD AUTO: 8.9 FL (ref 9.4–12.3)
POTASSIUM SERPL-SCNC: 3.9 MMOL/L (ref 3.5–5.1)
RBC # BLD AUTO: 3.04 M/UL (ref 4.23–5.6)
SERVICE CMNT-IMP: ABNORMAL
SODIUM SERPL-SCNC: 137 MMOL/L (ref 136–145)
VANCOMYCIN SERPL-MCNC: 14 UG/ML
WBC # BLD AUTO: 6.7 K/UL (ref 4.3–11.1)

## 2025-03-09 PROCEDURE — 83735 ASSAY OF MAGNESIUM: CPT

## 2025-03-09 PROCEDURE — 80202 ASSAY OF VANCOMYCIN: CPT

## 2025-03-09 PROCEDURE — 2500000003 HC RX 250 WO HCPCS: Performed by: ORTHOPAEDIC SURGERY

## 2025-03-09 PROCEDURE — 6360000002 HC RX W HCPCS: Performed by: FAMILY MEDICINE

## 2025-03-09 PROCEDURE — 80048 BASIC METABOLIC PNL TOTAL CA: CPT

## 2025-03-09 PROCEDURE — 85027 COMPLETE CBC AUTOMATED: CPT

## 2025-03-09 PROCEDURE — 6370000000 HC RX 637 (ALT 250 FOR IP): Performed by: FAMILY MEDICINE

## 2025-03-09 PROCEDURE — 82962 GLUCOSE BLOOD TEST: CPT

## 2025-03-09 PROCEDURE — 2580000003 HC RX 258: Performed by: FAMILY MEDICINE

## 2025-03-09 PROCEDURE — 1100000000 HC RM PRIVATE

## 2025-03-09 PROCEDURE — 97530 THERAPEUTIC ACTIVITIES: CPT

## 2025-03-09 PROCEDURE — 6360000002 HC RX W HCPCS: Performed by: ORTHOPAEDIC SURGERY

## 2025-03-09 PROCEDURE — 2500000003 HC RX 250 WO HCPCS: Performed by: FAMILY MEDICINE

## 2025-03-09 PROCEDURE — 36415 COLL VENOUS BLD VENIPUNCTURE: CPT

## 2025-03-09 PROCEDURE — 6370000000 HC RX 637 (ALT 250 FOR IP): Performed by: PHYSICIAN ASSISTANT

## 2025-03-09 PROCEDURE — 6370000000 HC RX 637 (ALT 250 FOR IP): Performed by: ORTHOPAEDIC SURGERY

## 2025-03-09 PROCEDURE — 97535 SELF CARE MNGMENT TRAINING: CPT

## 2025-03-09 RX ADMIN — Medication 6 MG: at 20:28

## 2025-03-09 RX ADMIN — VANCOMYCIN HYDROCHLORIDE 1000 MG: 1 INJECTION, POWDER, LYOPHILIZED, FOR SOLUTION INTRAVENOUS at 23:45

## 2025-03-09 RX ADMIN — WATER 1000 MG: 1 INJECTION INTRAMUSCULAR; INTRAVENOUS; SUBCUTANEOUS at 15:15

## 2025-03-09 RX ADMIN — DOCUSATE SODIUM 100 MG: 100 CAPSULE, LIQUID FILLED ORAL at 20:29

## 2025-03-09 RX ADMIN — LISINOPRIL 40 MG: 20 TABLET ORAL at 07:49

## 2025-03-09 RX ADMIN — INSULIN LISPRO 2 UNITS: 100 INJECTION, SOLUTION INTRAVENOUS; SUBCUTANEOUS at 12:47

## 2025-03-09 RX ADMIN — VANCOMYCIN HYDROCHLORIDE 1000 MG: 1 INJECTION, POWDER, LYOPHILIZED, FOR SOLUTION INTRAVENOUS at 12:51

## 2025-03-09 RX ADMIN — HYDROMORPHONE HYDROCHLORIDE 2 MG: 2 TABLET ORAL at 20:28

## 2025-03-09 RX ADMIN — WATER 1000 MG: 1 INJECTION INTRAMUSCULAR; INTRAVENOUS; SUBCUTANEOUS at 06:30

## 2025-03-09 RX ADMIN — HYDROMORPHONE HYDROCHLORIDE 2 MG: 2 TABLET ORAL at 10:35

## 2025-03-09 RX ADMIN — DULOXETINE HYDROCHLORIDE 30 MG: 30 CAPSULE, DELAYED RELEASE ORAL at 07:49

## 2025-03-09 RX ADMIN — FERROUS SULFATE TAB 325 MG (65 MG ELEMENTAL FE) 325 MG: 325 (65 FE) TAB at 07:49

## 2025-03-09 RX ADMIN — DOCUSATE SODIUM 100 MG: 100 CAPSULE, LIQUID FILLED ORAL at 07:49

## 2025-03-09 RX ADMIN — PRAVASTATIN SODIUM 80 MG: 80 TABLET ORAL at 20:28

## 2025-03-09 RX ADMIN — INSULIN GLARGINE 15 UNITS: 100 INJECTION, SOLUTION SUBCUTANEOUS at 07:49

## 2025-03-09 RX ADMIN — INSULIN LISPRO 1 UNITS: 100 INJECTION, SOLUTION INTRAVENOUS; SUBCUTANEOUS at 07:47

## 2025-03-09 RX ADMIN — INSULIN LISPRO 1 UNITS: 100 INJECTION, SOLUTION INTRAVENOUS; SUBCUTANEOUS at 20:30

## 2025-03-09 RX ADMIN — HYDROMORPHONE HYDROCHLORIDE 2 MG: 2 TABLET ORAL at 03:38

## 2025-03-09 RX ADMIN — WATER 1000 MG: 1 INJECTION INTRAMUSCULAR; INTRAVENOUS; SUBCUTANEOUS at 23:14

## 2025-03-09 RX ADMIN — INSULIN LISPRO 2 UNITS: 100 INJECTION, SOLUTION INTRAVENOUS; SUBCUTANEOUS at 07:48

## 2025-03-09 RX ADMIN — SODIUM CHLORIDE, PRESERVATIVE FREE 10 ML: 5 INJECTION INTRAVENOUS at 20:35

## 2025-03-09 ASSESSMENT — PAIN DESCRIPTION - DESCRIPTORS
DESCRIPTORS: THROBBING
DESCRIPTORS: ACHING
DESCRIPTORS: ACHING

## 2025-03-09 ASSESSMENT — PAIN SCALES - WONG BAKER: WONGBAKER_NUMERICALRESPONSE: NO HURT

## 2025-03-09 ASSESSMENT — PAIN DESCRIPTION - ORIENTATION
ORIENTATION: LEFT

## 2025-03-09 ASSESSMENT — PAIN SCALES - GENERAL
PAINLEVEL_OUTOF10: 3
PAINLEVEL_OUTOF10: 5
PAINLEVEL_OUTOF10: 3
PAINLEVEL_OUTOF10: 3
PAINLEVEL_OUTOF10: 6
PAINLEVEL_OUTOF10: 5
PAINLEVEL_OUTOF10: 8

## 2025-03-09 ASSESSMENT — PAIN DESCRIPTION - LOCATION
LOCATION: SHOULDER

## 2025-03-09 ASSESSMENT — PAIN - FUNCTIONAL ASSESSMENT
PAIN_FUNCTIONAL_ASSESSMENT: ACTIVITIES ARE NOT PREVENTED
PAIN_FUNCTIONAL_ASSESSMENT: ACTIVITIES ARE NOT PREVENTED

## 2025-03-09 NOTE — PROGRESS NOTES
ACUTE PHYSICAL THERAPY GOALS:   (Developed with and agreed upon by patient and/or caregiver.)  GOALS (1-4 days):  (1.)  Patient will move from supine to sit and sit to supine  in bed with CONTACT GUARD ASSIST.    (2.)  Patient will transfer from bed to chair and chair to bed with CONTACT GUARD ASSIST using the least restrictive device.    (3.)  Patient will ambulate with CONTACT GUARD ASSIST for 25 feet with the least restrictive device.  (4.)  Patient will be independent with shoulder HEP to increase range of motion per MD orders.  ________________________________________________________________________________________________      PHYSICAL THERAPY: SHOULDER Daily Note and AM  (Link to Caseload Tracking: PT Visit Days : 3  Acknowledge Orders Time In/Out  PT Charge Capture  Rehab Caseload Tracker    Zack Treviño is a 69 y.o. 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]  Procedure(s) (LRB):  INCISION, DEBRIDEMENT, REMOVAL IMPLANT LEFT SHOULDER \"RTSA\" WITH A HUMERAL OSTEOTOMY  and INSERTION REMEDY ANTIBIOTIC CEMENT SPACER AND ANTIBIOTIC BEADS LEFT SHOULDER (Left)  3 Days Post-Op  Reason for Referral: Pain in Left Shoulder (M25.512)  Stiffness of Left Shoulder, Not elsewhere classified (M25.612)  Other abnormalities of gait and mobility (R26.89)  Hemiplegia and hemiparesis following cerebral infarction affecting left non-dominant side (I69.354)  Inpatient: Payor: [facility] MEDICARE / Plan: [employer] DUAL COMPLETE / Product Type: *No Product type* /     MOVEMENT PRECAUTIONS   TWICE A DAY   Elbow, hand, gentle pendulums left shoulder.   NO OTHER MOTION.   Nwb   Sling left shoulder      REHAB RECOMMENDATIONS:   Recommendation to

## 2025-03-09 NOTE — CARE COORDINATION
CM Note:    Chart reviewed for updates. Pt has been accepted to [facility] pending medical stability. Cultures pending at this time. Pt will need insurance auth. CM will continue to follow up with d/c planning.    MANGO Altman

## 2025-03-09 NOTE — PROGRESS NOTES
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   PT/OT:                             Weight Bearing Status: Nonweightbearing left upper extremity  BMP:  Recent Labs     25  0825  0723   BUN 15 18 16   * 136 137   K 3.8 3.7 3.9   CL 98 98 99   CO2      Patient Vitals for the past 8 hrs:   BP Temp Temp src Pulse Resp SpO2   25 0730 (!) 156/88 98.4 °F (36.9 °C) Oral 90 18 96 %   25 0408 -- -- -- -- 16 --     Temp (24hrs), Av.5 °F (36.9 °C), Min:98.4 °F (36.9 °C), Max:98.6 °F (37 °C)    CBC:  Recent Labs     25  0825  0723   WBC 8.0 8.0 6.7   HGB 9.1* 8.8* 8.4*   HCT 28.2* 27.4* 25.7*    285 256       Microbiology:     Recent Results (from the past 72 hours)   TYPE AND SCREEN    Collection Time: 25 11:51 AM   Result Value Ref Range    Crossmatch expiration date 2025,5853     ABO/Rh A POSITIVE     Antibody Screen NEG    Hemoglobin A1C    Collection Time: 25 11:51 AM   Result Value Ref Range    Hemoglobin A1C 7.5 (H) 0 - 5.6 %    Estimated Avg Glucose 169 mg/dL   C-Reactive Protein    Collection Time: 25 11:51 AM   Result Value Ref Range    CRP 12.1 (H) 0.0 - 0.4 mg/dL   Sedimentation Rate    Collection Time: 25 11:51 AM   Result Value Ref Range    Sed Rate, Automated 52 (H) 15 mm/hr   Vitamin D 25 Hydroxy    Collection Time: 25 11:51 AM   Result Value Ref Range    Vit D, 25-Hydroxy 18.1 (L) 30.0 - 100.0 ng/mL   POCT Glucose    Collection Time: 25 11:54 AM   Result Value Ref

## 2025-03-09 NOTE — PROGRESS NOTES
OCCUPATIONAL THERAPY Daily Note and AM      (Link to Caseload Tracking: OT Visit Days: 2  OT Orders   Time  OT Charge Capture  Rehab Caseload Tracker  Episode     Zack Treviño is a 69 y.o. 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]  Procedure(s) (LRB):  INCISION, DEBRIDEMENT, REMOVAL IMPLANT LEFT SHOULDER \"RTSA\" WITH A HUMERAL OSTEOTOMY  and INSERTION REMEDY ANTIBIOTIC CEMENT SPACER AND ANTIBIOTIC BEADS LEFT SHOULDER (Left)  3 Days Post-Op  Reason for Referral: Pain in Left Shoulder (M25.512)  Stiffness of Left Shoulder, Not elsewhere classified (M25.612)  Inpatient: Payor: [facility] MEDICARE / Plan: [employer] DUAL COMPLETE / Product Type: *No Product type* /     ASSESSMENT:     REHAB RECOMMENDATIONS:   Recommendation to date pending progress:  Setting:  Short-term Rehab    Equipment:    To Be Determined     ASSESSMENT:  Mr. Treviño is s/p INCISION, DEBRIDEMENT, REMOVAL IMPLANT LEFT SHOULDER \"RTSA\" WITH A HUMERAL OSTEOTOMY  and INSERTION REMEDY ANTIBIOTIC CEMENT SPACER AND ANTIBIOTIC BEADS LEFT SHOULDER (Left) . He presents with decreased independence with functional mobility and activities of daily living as compared to baseline level of function and safety. Patient would benefit from skilled Occupational Therapy to maximize independence and safety with self-care task and functional mobility.   Patient lives with his son who in/out of the house during the day. He had a previous CVA with L sided deficits- he has very minimal active movement of left UE. Patient is [place] Irish speaking,  used for duration of session. Patient needed max assistance for donning gown and adjusting sling. He completed supine to sit with mod  Tolerance  Decreased Balance  Decreased Coordination  Increased Pain   INTERVENTIONS PLANNED:   (Benefits and precautions of occupational therapy have been discussed with the patient.)  Self Care Training  Therapeutic Activity  Therapeutic Exercise/HEP  Neuromuscular Re-education  Manual Therapy  Education         TREATMENT:     TREATMENT:   Self Care (10 minutes): Patient participated in toileting, self feeding, grooming, functional mobility, functional transfer, bathroom mobility, and compensatory technique in supported sitting, unsupported sitting, and standing with minimal visual, verbal, and tactile cueing to increase independence, decrease assistance required, and increase activity tolerance. The patient was educated on role of occupational therapy, compensatory technique during ADL , strategies to improve safety, proper use of assistive device, recommended equipment, transfer training and safety, and surgical precautions and patient verbalized understanding and will need reinforcement at next session.     AFTER TREATMENT PRECAUTIONS: Bed/Chair Locked, Call light within reach, Chair, Heels floated, Needs within reach, and RN notified    INTERDISCIPLINARY COLLABORATION:  RN/ PCT and PT/ PTA    Interdisciplinary Patient Education  (Reference education tab)    [x] Safe And Effective Hygiene  [x] Fall Precautions  [x] Precautions  [] D/C Instruction Review [x] Self Care Training and Home Safety  [] Walker Management/Safety  [x] Adaptive Equipment as Needed  [x] Therapeutic Resting Position of Joint     TOTAL TREATMENT DURATION AND TIME:  Time In: 1110  Time Out: 1120  Minutes: 10    Carissa Leon OT

## 2025-03-09 NOTE — PROGRESS NOTES
VANCO DAILY FOLLOW UP NOTE  [facility]   Pharmacy Pharmacokinetic Monitoring Service - Vancomycin    Consulting Provider: Dr Castle   Indication: Surgical Site Infection  Target Concentration: Goal AUC/MANUEL 400-600 mg*hr/L  Day of Therapy: 4  Additional Antimicrobials: Ancef    Pertinent Laboratory Values:   Wt Readings from Last 1 Encounters:   03/06/25 77.2 kg (170 lb 4.8 oz)     Temp Readings from Last 1 Encounters:   03/09/25 98.4 °F (36.9 °C) (Oral)     Recent Labs     03/07/25  0335 03/08/25  0809 03/09/25  0723   BUN 15 18 16   CREATININE 0.64* 0.75* 0.67*   WBC 8.0 8.0 6.7     Estimated Creatinine Clearance: 103 mL/min (A) (based on SCr of 0.67 mg/dL (L)).    Lab Results   Component Value Date/Time    VANCORANDOM 14.0 03/09/2025 07:23 AM       MRSA Nasal Swab: N/A. Non-respiratory infection    Assessment:  Date/Time Dose Concentration AUC   3/8/25  0809 750 mg q12h 12.9 350   3/9/25  0723 1000 mg q12h 14.0 416   Note: Serum concentrations collected for AUC dosing may appear elevated if collected in close proximity to the dose administered, this is not necessarily an indication of toxicity    Plan:  Current dosing regimen is therapeutic  Continue current dose  Repeat vancomycin concentrations will be ordered as clinically appropriate   Pharmacy will continue to monitor patient and adjust therapy as indicated      Thank you for the consult,    Mari Bruner, GeraldD

## 2025-03-10 LAB
ANION GAP SERPL CALC-SCNC: 8 MMOL/L (ref 7–16)
BUN SERPL-MCNC: 14 MG/DL (ref 8–23)
CALCIUM SERPL-MCNC: 9.7 MG/DL (ref 8.8–10.2)
CHLORIDE SERPL-SCNC: 99 MMOL/L (ref 98–107)
CO2 SERPL-SCNC: 28 MMOL/L (ref 20–29)
CREAT SERPL-MCNC: 0.64 MG/DL (ref 0.8–1.3)
ERYTHROCYTE [DISTWIDTH] IN BLOOD BY AUTOMATED COUNT: 11.9 % (ref 11.9–14.6)
GLUCOSE BLD STRIP.AUTO-MCNC: 171 MG/DL (ref 65–100)
GLUCOSE BLD STRIP.AUTO-MCNC: 182 MG/DL (ref 65–100)
GLUCOSE BLD STRIP.AUTO-MCNC: 186 MG/DL (ref 65–100)
GLUCOSE BLD STRIP.AUTO-MCNC: 187 MG/DL (ref 65–100)
GLUCOSE SERPL-MCNC: 183 MG/DL (ref 70–99)
HCT VFR BLD AUTO: 26.5 % (ref 41.1–50.3)
HGB BLD-MCNC: 8.6 G/DL (ref 13.6–17.2)
MAGNESIUM SERPL-MCNC: 1.3 MG/DL (ref 1.8–2.4)
MCH RBC QN AUTO: 27.6 PG (ref 26.1–32.9)
MCHC RBC AUTO-ENTMCNC: 32.5 G/DL (ref 31.4–35)
MCV RBC AUTO: 84.9 FL (ref 82–102)
NRBC # BLD: 0 K/UL (ref 0–0.2)
PLATELET # BLD AUTO: 265 K/UL (ref 150–450)
PMV BLD AUTO: 9.1 FL (ref 9.4–12.3)
POTASSIUM SERPL-SCNC: 3.5 MMOL/L (ref 3.5–5.1)
RBC # BLD AUTO: 3.12 M/UL (ref 4.23–5.6)
SERVICE CMNT-IMP: ABNORMAL
SODIUM SERPL-SCNC: 135 MMOL/L (ref 136–145)
WBC # BLD AUTO: 7.7 K/UL (ref 4.3–11.1)

## 2025-03-10 PROCEDURE — 6360000002 HC RX W HCPCS: Performed by: FAMILY MEDICINE

## 2025-03-10 PROCEDURE — 6370000000 HC RX 637 (ALT 250 FOR IP): Performed by: PHYSICIAN ASSISTANT

## 2025-03-10 PROCEDURE — 1100000000 HC RM PRIVATE

## 2025-03-10 PROCEDURE — 6370000000 HC RX 637 (ALT 250 FOR IP): Performed by: ORTHOPAEDIC SURGERY

## 2025-03-10 PROCEDURE — 85027 COMPLETE CBC AUTOMATED: CPT

## 2025-03-10 PROCEDURE — 6370000000 HC RX 637 (ALT 250 FOR IP): Performed by: FAMILY MEDICINE

## 2025-03-10 PROCEDURE — 83735 ASSAY OF MAGNESIUM: CPT

## 2025-03-10 PROCEDURE — 2500000003 HC RX 250 WO HCPCS: Performed by: FAMILY MEDICINE

## 2025-03-10 PROCEDURE — 80048 BASIC METABOLIC PNL TOTAL CA: CPT

## 2025-03-10 PROCEDURE — 97530 THERAPEUTIC ACTIVITIES: CPT

## 2025-03-10 PROCEDURE — 2580000003 HC RX 258: Performed by: FAMILY MEDICINE

## 2025-03-10 PROCEDURE — 36415 COLL VENOUS BLD VENIPUNCTURE: CPT

## 2025-03-10 PROCEDURE — 82962 GLUCOSE BLOOD TEST: CPT

## 2025-03-10 PROCEDURE — 6360000002 HC RX W HCPCS: Performed by: ORTHOPAEDIC SURGERY

## 2025-03-10 RX ORDER — HYDROMORPHONE HYDROCHLORIDE 2 MG/1
2 TABLET ORAL EVERY 4 HOURS PRN
Qty: 18 TABLET | Refills: 0 | Status: SHIPPED | OUTPATIENT
Start: 2025-03-10 | End: 2025-03-13

## 2025-03-10 RX ORDER — MAGNESIUM SULFATE IN WATER 40 MG/ML
2000 INJECTION, SOLUTION INTRAVENOUS ONCE
Status: COMPLETED | OUTPATIENT
Start: 2025-03-10 | End: 2025-03-10

## 2025-03-10 RX ADMIN — VANCOMYCIN HYDROCHLORIDE 1000 MG: 1 INJECTION, POWDER, LYOPHILIZED, FOR SOLUTION INTRAVENOUS at 11:39

## 2025-03-10 RX ADMIN — VANCOMYCIN HYDROCHLORIDE 1000 MG: 1 INJECTION, POWDER, LYOPHILIZED, FOR SOLUTION INTRAVENOUS at 23:31

## 2025-03-10 RX ADMIN — HYDROMORPHONE HYDROCHLORIDE 2 MG: 2 TABLET ORAL at 20:56

## 2025-03-10 RX ADMIN — PRAVASTATIN SODIUM 80 MG: 80 TABLET ORAL at 20:56

## 2025-03-10 RX ADMIN — INSULIN GLARGINE 15 UNITS: 100 INJECTION, SOLUTION SUBCUTANEOUS at 08:12

## 2025-03-10 RX ADMIN — INSULIN LISPRO 1 UNITS: 100 INJECTION, SOLUTION INTRAVENOUS; SUBCUTANEOUS at 20:58

## 2025-03-10 RX ADMIN — FERROUS SULFATE TAB 325 MG (65 MG ELEMENTAL FE) 325 MG: 325 (65 FE) TAB at 15:15

## 2025-03-10 RX ADMIN — INSULIN LISPRO 2 UNITS: 100 INJECTION, SOLUTION INTRAVENOUS; SUBCUTANEOUS at 16:53

## 2025-03-10 RX ADMIN — MAGNESIUM SULFATE HEPTAHYDRATE 2000 MG: 40 INJECTION, SOLUTION INTRAVENOUS at 17:54

## 2025-03-10 RX ADMIN — DOCUSATE SODIUM 100 MG: 100 CAPSULE, LIQUID FILLED ORAL at 08:12

## 2025-03-10 RX ADMIN — WATER 1000 MG: 1 INJECTION INTRAMUSCULAR; INTRAVENOUS; SUBCUTANEOUS at 06:30

## 2025-03-10 RX ADMIN — INSULIN LISPRO 1 UNITS: 100 INJECTION, SOLUTION INTRAVENOUS; SUBCUTANEOUS at 16:52

## 2025-03-10 RX ADMIN — INSULIN LISPRO 2 UNITS: 100 INJECTION, SOLUTION INTRAVENOUS; SUBCUTANEOUS at 11:39

## 2025-03-10 RX ADMIN — WATER 1000 MG: 1 INJECTION INTRAMUSCULAR; INTRAVENOUS; SUBCUTANEOUS at 23:23

## 2025-03-10 RX ADMIN — ACETAMINOPHEN 500 MG: 500 TABLET, FILM COATED ORAL at 17:47

## 2025-03-10 RX ADMIN — INSULIN LISPRO 1 UNITS: 100 INJECTION, SOLUTION INTRAVENOUS; SUBCUTANEOUS at 08:15

## 2025-03-10 RX ADMIN — DOCUSATE SODIUM 100 MG: 100 CAPSULE, LIQUID FILLED ORAL at 20:56

## 2025-03-10 RX ADMIN — HYDROMORPHONE HYDROCHLORIDE 2 MG: 2 TABLET ORAL at 02:36

## 2025-03-10 RX ADMIN — DULOXETINE HYDROCHLORIDE 30 MG: 30 CAPSULE, DELAYED RELEASE ORAL at 08:12

## 2025-03-10 RX ADMIN — HYDROMORPHONE HYDROCHLORIDE 2 MG: 2 TABLET ORAL at 08:12

## 2025-03-10 RX ADMIN — LISINOPRIL 40 MG: 20 TABLET ORAL at 08:12

## 2025-03-10 RX ADMIN — WATER 1000 MG: 1 INJECTION INTRAMUSCULAR; INTRAVENOUS; SUBCUTANEOUS at 15:15

## 2025-03-10 RX ADMIN — Medication 6 MG: at 20:56

## 2025-03-10 RX ADMIN — FERROUS SULFATE TAB 325 MG (65 MG ELEMENTAL FE) 325 MG: 325 (65 FE) TAB at 08:12

## 2025-03-10 RX ADMIN — HYDROMORPHONE HYDROCHLORIDE 2 MG: 2 TABLET ORAL at 15:15

## 2025-03-10 RX ADMIN — INSULIN LISPRO 2 UNITS: 100 INJECTION, SOLUTION INTRAVENOUS; SUBCUTANEOUS at 08:15

## 2025-03-10 ASSESSMENT — PAIN - FUNCTIONAL ASSESSMENT
PAIN_FUNCTIONAL_ASSESSMENT: ACTIVITIES ARE NOT PREVENTED

## 2025-03-10 ASSESSMENT — PAIN SCALES - GENERAL
PAINLEVEL_OUTOF10: 6
PAINLEVEL_OUTOF10: 3
PAINLEVEL_OUTOF10: 8
PAINLEVEL_OUTOF10: 3
PAINLEVEL_OUTOF10: 6
PAINLEVEL_OUTOF10: 4
PAINLEVEL_OUTOF10: 3
PAINLEVEL_OUTOF10: 6
PAINLEVEL_OUTOF10: 2
PAINLEVEL_OUTOF10: 3

## 2025-03-10 ASSESSMENT — PAIN DESCRIPTION - ORIENTATION
ORIENTATION: LEFT
ORIENTATION: RIGHT

## 2025-03-10 ASSESSMENT — PAIN DESCRIPTION - DESCRIPTORS
DESCRIPTORS: ACHING;SORE
DESCRIPTORS: SORE;ACHING
DESCRIPTORS: ACHING;SORE
DESCRIPTORS: ACHING

## 2025-03-10 ASSESSMENT — PAIN DESCRIPTION - LOCATION
LOCATION: SHOULDER

## 2025-03-10 ASSESSMENT — PAIN SCALES - WONG BAKER: WONGBAKER_NUMERICALRESPONSE: HURTS LITTLE MORE

## 2025-03-10 NOTE — PROGRESS NOTES
ADULT DIET; Regular; 3 carb choices (45 gm/meal)  VTE prophylaxis: Defer to primary team  Code status: Full Code      Non-peripheral Lines and Tubes (if present):          Telemetry (if present):           Hospital Problems:  Principal Problem:    Prosthetic shoulder infection, initial encounter  Active Problems:    Prosthetic shoulder infection    Presence of left artificial shoulder joint    Infection of prosthetic total shoulder joint, initial encounter  Resolved Problems:    * No resolved hospital problems. *      Objective:   Patient Vitals for the past 24 hrs:   Temp Pulse Resp BP SpO2   03/10/25 0842 -- -- 17 -- --   03/10/25 0744 -- 84 -- (!) 174/94 95 %   03/10/25 0742 98.4 °F (36.9 °C) 85 16 (!) 160/102 98 %   03/10/25 0306 -- -- 18 -- --   03/09/25 2058 -- -- 18 -- --   03/09/25 2016 98.1 °F (36.7 °C) 82 20 (!) 163/87 97 %       Oxygen Therapy  SpO2: 95 %  Pulse via Oximetry: 86 beats per minute  Pulse Oximeter Device Mode: Continuous  Pulse Oximeter Device Location: Finger  O2 Device: None (Room air)  FiO2 : 30 %  O2 Flow Rate (L/min): 30 L/min  ETCO2 (mmHg): 29 mmHg    Estimated body mass index is 27.5 kg/m² as calculated from the following:    Height as of this encounter: 1.676 m (5' 5.98\").    Weight as of this encounter: 77.2 kg (170 lb 4.8 oz).  No intake or output data in the 24 hours ending 03/10/25 1241      Physical Exam:   General:          Well nourished.    Head:               Normocephalic, atraumatic  Eyes:               Sclerae appear normal.  Pupils equally round.  ENT:                Nares appear normal.  Moist oral mucosa  Neck:               No restricted ROM.  Trachea midline   CV:                  RRR.  No m/r/g.  No jugular venous distension.  Lungs:             CTAB.  No wheezing, rhonchi, or rales.  Symmetric expansion.  Abdomen:        Soft, nontender, nondistended.  Extremities:     No cyanosis or clubbing.  No edema. L shoulder dressing c/d/i  Skin:                No rashes.   AM   Result Value Ref Range    POC Glucose 179 (H) 65 - 100 mg/dL    Performed by: ChimatoPhoenixBSN    POCT Glucose    Collection Time: 03/09/25  4:13 PM   Result Value Ref Range    POC Glucose 154 (H) 65 - 100 mg/dL    Performed by: MAKAYLA    POCT Glucose    Collection Time: 03/09/25  8:12 PM   Result Value Ref Range    POC Glucose 197 (H) 65 - 100 mg/dL    Performed by: Brandy    Basic Metabolic Panel    Collection Time: 03/10/25  3:38 AM   Result Value Ref Range    Sodium 135 (L) 136 - 145 mmol/L    Potassium 3.5 3.5 - 5.1 mmol/L    Chloride 99 98 - 107 mmol/L    CO2 28 20 - 29 mmol/L    Anion Gap 8 7 - 16 mmol/L    Glucose 183 (H) 70 - 99 mg/dL    BUN 14 8 - 23 MG/DL    Creatinine 0.64 (L) 0.80 - 1.30 MG/DL    Est, Glom Filt Rate >90 >60 ml/min/1.73m2    Calcium 9.7 8.8 - 10.2 MG/DL   CBC    Collection Time: 03/10/25  3:38 AM   Result Value Ref Range    WBC 7.7 4.3 - 11.1 K/uL    RBC 3.12 (L) 4.23 - 5.6 M/uL    Hemoglobin 8.6 (L) 13.6 - 17.2 g/dL    Hematocrit 26.5 (L) 41.1 - 50.3 %    MCV 84.9 82.0 - 102.0 FL    MCH 27.6 26.1 - 32.9 PG    MCHC 32.5 31.4 - 35.0 g/dL    RDW 11.9 11.9 - 14.6 %    Platelets 265 150 - 450 K/uL    MPV 9.1 (L) 9.4 - 12.3 FL    nRBC 0.00 0.0 - 0.2 K/uL   Magnesium    Collection Time: 03/10/25  3:38 AM   Result Value Ref Range    Magnesium 1.3 (L) 1.8 - 2.4 mg/dL   POCT Glucose    Collection Time: 03/10/25  7:45 AM   Result Value Ref Range    POC Glucose 187 (H) 65 - 100 mg/dL    Performed by: Pepe    POCT Glucose    Collection Time: 03/10/25 11:25 AM   Result Value Ref Range    POC Glucose 171 (H) 65 - 100 mg/dL    Performed by: Pepe        No results for input(s): \"COVID19\" in the last 72 hours.    Current Meds:  Current Facility-Administered Medications   Medication Dose Route Frequency    acetaminophen (TYLENOL) tablet 500 mg  500 mg Oral Q4H PRN    melatonin tablet 6 mg  6 mg Oral Nightly    HYDROmorphone (DILAUDID) tablet 2

## 2025-03-10 NOTE — CARE COORDINATION
CM note:    Update note: Final ID recs have been sent to [facility]. CM will continue to follow up with d/c planning.    Initial note: Chart reviewed for updates. Auth has been started via [employer]. Ref# [id number]. Auth is pending review. CM will continue to follow up with d/c planning.    MANGO Altman

## 2025-03-10 NOTE — PROGRESS NOTES
ACUTE PHYSICAL THERAPY GOALS:   (Developed with and agreed upon by patient and/or caregiver.)  GOALS (1-4 days):  (1.)  Patient will move from supine to sit and sit to supine  in bed with CONTACT GUARD ASSIST.    (2.)  Patient will transfer from bed to chair and chair to bed with CONTACT GUARD ASSIST using the least restrictive device.    (3.)  Patient will ambulate with CONTACT GUARD ASSIST for 25 feet with the least restrictive device.  (4.)  Patient will be independent with shoulder HEP to increase range of motion per MD orders.  ________________________________________________________________________________________________      PHYSICAL THERAPY: SHOULDER Daily Note and AM  (Link to Caseload Tracking: PT Visit Days : 4  Acknowledge Orders Time In/Out  PT Charge Capture  Rehab Caseload Tracker    Zack Treviño is a 69 y.o. 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]  Procedure(s) (LRB):  INCISION, DEBRIDEMENT, REMOVAL IMPLANT LEFT SHOULDER \"RTSA\" WITH A HUMERAL OSTEOTOMY  and INSERTION REMEDY ANTIBIOTIC CEMENT SPACER AND ANTIBIOTIC BEADS LEFT SHOULDER (Left)  4 Days Post-Op  Reason for Referral: Pain in Left Shoulder (M25.512)  Stiffness of Left Shoulder, Not elsewhere classified (M25.612)  Other abnormalities of gait and mobility (R26.89)  Hemiplegia and hemiparesis following cerebral infarction affecting left non-dominant side (I69.354)  Inpatient: Payor: [facility] MEDICARE / Plan: [employer] DUAL COMPLETE / Product Type: *No Product type* /     MOVEMENT PRECAUTIONS   TWICE A DAY   Elbow, hand, gentle pendulums left shoulder.   NO OTHER MOTION.   Nwb   Sling left shoulder      REHAB RECOMMENDATIONS:   Recommendation to  [] [] On eob upon arrival   Supine to Sit [] [] [] [] [] [] [] [] [] [] []    Scooting [] [] [] [x] [] [] [] [] [] [] []    Sit to Supine [] [] [] [x] [] [] [] [] [] [] []    Transfers    Sit to Stand [] [] [] [x] [] [] [] [] [] [] []    Bed to Chair [] [] [] [x] [] [] [] [] [] [] []    Stand to Sit [] [] [] [x] [] [] [] [] [] [] []    Stand Pivot [] [] [] [] [] [] [] [] [] [] []    Toilet [] [] [] [] [] [] [] [] [] [] []     [] [] [] [] [] [] [] [] [] [] []    I=Independent, Mod I=Modified Independent, S=Supervision, SBA=Standby Assistance, CGA=Contact Guard Assistance,   Min=Minimal Assistance, Mod=Moderate Assistance, Max=Maximal Assistance, Total=Total Assistance, NT=Not Tested    GAIT: I Mod I S SBA CGA Min Mod Max Total  NT x2 Comments:   Level of Assistance [] [] [] [x] [] [] [] [] [] [] []    Weightbearing Status       Distance  20 (+ 20) feet    Gait Quality Decreased dayana , Decreased step clearance, Decreased step length, Decreased stance, and Hemiplegic    DME SPC     Stairs      I=Independent, Mod I=Modified Independent, S=Supervision, SBA=Standby Assistance, CGA=Contact Guard Assistance,   Min=Minimal Assistance, Mod=Moderate Assistance, Max=Maximal Assistance, Total=Total Assistance, NT=Not Tested    BALANCE: Good Fair+ Fair Fair- Poor NT Comments   Sitting Static [x] [] [] [] [] []    Sitting Dynamic [] [x] [] [] [] []              Standing Static [] [] [x] [] [] [] With cane   Standing Dynamic [] [] [x] [] [] [] With cane     PLAN:   FREQUENCY AND DURATION: Daily for duration of hospital stay or until stated goals are met, whichever comes first.    THERAPY PROGNOSIS: Good    PROBLEM LIST:   (Skilled intervention is medically necessary to address:)  Decreased ADL/Functional Activities  Decreased Activity Tolerance  Decreased AROM/PROM  Decreased Balance  Decreased Coordination  Decreased Gait Ability  Decreased Safety Awareness  Decreased Strength  Decreased Transfer Abilities  Increased Pain

## 2025-03-10 NOTE — PROGRESS NOTES
Patient/caregivers speak Maltese  as their preferred language for their healthcare communication. For safe communication, use the [employer]  carts or call:    Senior  Navigator Annabelle Florentino at [phone] or   [employer] phone services for [facility] at [phone]    General phone: [phone] ( [phone])  Email: [email or web address]    Always document the use of interpreting services ('s ID number) in your clinical notes.    Our interpreters are available for team members working with limited  English proficient (LEP) patients remotely, via phone or video or in person (if needed for special cases).    When using family members to interpret, for the safety of the patient and protection of the communication of both our patient and [facility] staff the VRI or telephonic  should remain on the line to monitor that all communication is accurate and complete. The  should be instructed to notify [facility] staff immediately if there are any inaccuracies.         Thank you,        Annabelle NICOLE  Senior /Navigator

## 2025-03-10 NOTE — PROGRESS NOTES
[facility]        March 10, 2025         Post Op day: 4 Days Post-Op Procedure(s) (LRB):  INCISION, DEBRIDEMENT, REMOVAL IMPLANT LEFT SHOULDER \"RTSA\" WITH A HUMERAL OSTEOTOMY  and INSERTION REMEDY ANTIBIOTIC CEMENT SPACER AND ANTIBIOTIC BEADS LEFT SHOULDER (Left)      Admit Date: 3/6/2025  Admit Diagnosis: Prosthetic shoulder infection [T84.59XA, Z96.619]  Presence of left artificial shoulder joint [Z96.612]  Prosthetic shoulder infection, initial encounter [T84.59XA, Z96.619]  Infection of prosthetic total shoulder joint, initial encounter [T84.59XA, Z96.619]       Principle Problem: Prosthetic shoulder infection, initial encounter.           Subjective: Doing well, No complaints, No SOB, No Chest Pain, No Nausea or Vomiting     Objective:   Vital Signs are Stable, No Acute Distress, Alert,  Dressing is Dry, neurologic exam at baseline      Assessment / Plan :  Patient Active Problem List   Diagnosis    Hypomagnesemia    Hypertension    History of CVA (cerebrovascular accident)    Anemia associated with acute blood loss    Hypokalemia    Abdominal distension, gaseous    Arthritis of knee    Atherosclerosis of aorta    Chronic fatigue syndrome    Chronic pain    Chronic pain of right knee    Compression fracture of L1 lumbar vertebra (HCC)    Constipation, chronic    CVA, old, hemiparesis (HCC)    DDD (degenerative disc disease), lumbar    HNP (herniated nucleus pulposus), lumbar    Decreased testosterone level    Depressive disorder    Dysuria    Erectile dysfunction due to diseases classified elsewhere    Fatty liver    Frequent falls    Gait abnormality    GERD (gastroesophageal reflux disease)    H/O shoulder surgery    History of colon polyps    History of stroke with current residual effects    Hypercholesteremia    Hyperlipidemia    Hyperplastic polyp of sigmoid colon    Injury of head    Iron deficiency anemia    Left hemiparesis (HCC)    Lower extremity edema    Lumbago with sciatica    Lumbar  radiculopathy, chronic    Spondylolisthesis at L4-L5 level    Major depressive disorder, recurrent, moderate (HCC)    Memory loss    Myalgia    Nontoxic multinodular goiter    MORRIS on CPAP    Pityriasis versicolor    Primary localized osteoarthrosis of shoulder region    Rhinitis, chronic    Severe episode of recurrent major depressive disorder, with psychotic features (HCC)    Sleep disorder    Cervical stenosis of spine    Spinal stenosis at L4-L5 level    Testicular hypofunction    Testosterone deficiency    Type 2 diabetes mellitus without complication, without long-term current use of insulin (HCC)    Essential hypertension    Prosthetic shoulder infection    Presence of left artificial shoulder joint    Prosthetic shoulder infection, initial encounter    Infection of prosthetic total shoulder joint, initial encounter    Patient Vitals for the past 8 hrs:   Resp   03/10/25 0306 18    Temp (24hrs), Av.3 °F (36.8 °C), Min:98.1 °F (36.7 °C), Max:98.4 °F (36.9 °C)    Body mass index is 27.5 kg/m².    Lab Results   Component Value Date/Time    HGB 8.6 03/10/2025 03:38 AM          S/P Procedure(s) (LRB):  INCISION, DEBRIDEMENT, REMOVAL IMPLANT LEFT SHOULDER \"RTSA\" WITH A HUMERAL OSTEOTOMY  and INSERTION REMEDY ANTIBIOTIC CEMENT SPACER AND ANTIBIOTIC BEADS LEFT SHOULDER (Left)    Continue PT/OT/Rehab  Cultures: GPC  Continue antibiotics per ID  DC dispo: [facility] when antibiotic recommendations per ID established   F/U as scheduled with Dr. Castle        Signed By: YENI Ventura  3/10/2025,  6:30 AM

## 2025-03-10 NOTE — PROGRESS NOTES
VANCO DAILY FOLLOW UP NOTE  [facility]   Pharmacy Pharmacokinetic Monitoring Service - Vancomycin    Consulting Provider: Dr Castle   Indication: Surgical Site Infection  Target Concentration: Goal AUC/MANUEL 400-600 mg*hr/L  Day of Therapy: 5  Additional Antimicrobials: Ancef    Pertinent Laboratory Values:   Wt Readings from Last 1 Encounters:   03/06/25 77.2 kg (170 lb 4.8 oz)     Temp Readings from Last 1 Encounters:   03/09/25 98.4 °F (36.9 °C) (Oral)     Recent Labs     03/07/25  0335 03/08/25  0809 03/09/25  0723   BUN 15 18 16   CREATININE 0.64* 0.75* 0.67*   WBC 8.0 8.0 6.7     Estimated Creatinine Clearance: 103 mL/min (A) (based on SCr of 0.67 mg/dL (L)).    Lab Results   Component Value Date/Time    VANCORANDOM 14.0 03/09/2025 07:23 AM       MRSA Nasal Swab: N/A. Non-respiratory infection    Assessment:  Date/Time Dose Concentration AUC   3/8/25  0809 750 mg q12h 12.9 350   3/9/25  0723 1000 mg q12h 14.0 416   Note: Serum concentrations collected for AUC dosing may appear elevated if collected in close proximity to the dose administered, this is not necessarily an indication of toxicity    Plan:  Current dosing regimen is therapeutic  Continue current dose  Repeat vancomycin concentration ordered for 3/11 @ 0600    Pharmacy will continue to monitor patient and adjust therapy as indicated    Thank you for the consult,  Sherri Thomas RPH

## 2025-03-11 VITALS
DIASTOLIC BLOOD PRESSURE: 102 MMHG | WEIGHT: 170.3 LBS | BODY MASS INDEX: 27.37 KG/M2 | HEIGHT: 66 IN | SYSTOLIC BLOOD PRESSURE: 161 MMHG | OXYGEN SATURATION: 96 % | TEMPERATURE: 98.6 F | RESPIRATION RATE: 17 BRPM | HEART RATE: 101 BPM

## 2025-03-11 LAB
ANION GAP SERPL CALC-SCNC: 8 MMOL/L (ref 7–16)
BUN SERPL-MCNC: 13 MG/DL (ref 8–23)
CALCIUM SERPL-MCNC: 9.6 MG/DL (ref 8.8–10.2)
CHLORIDE SERPL-SCNC: 101 MMOL/L (ref 98–107)
CO2 SERPL-SCNC: 29 MMOL/L (ref 20–29)
CREAT SERPL-MCNC: 0.73 MG/DL (ref 0.8–1.3)
ERYTHROCYTE [DISTWIDTH] IN BLOOD BY AUTOMATED COUNT: 12.1 % (ref 11.9–14.6)
GLUCOSE BLD STRIP.AUTO-MCNC: 161 MG/DL (ref 65–100)
GLUCOSE BLD STRIP.AUTO-MCNC: 162 MG/DL (ref 65–100)
GLUCOSE SERPL-MCNC: 164 MG/DL (ref 70–99)
HCT VFR BLD AUTO: 28.3 % (ref 41.1–50.3)
HGB BLD-MCNC: 9.5 G/DL (ref 13.6–17.2)
MAGNESIUM SERPL-MCNC: 1.6 MG/DL (ref 1.8–2.4)
MCH RBC QN AUTO: 28.4 PG (ref 26.1–32.9)
MCHC RBC AUTO-ENTMCNC: 33.6 G/DL (ref 31.4–35)
MCV RBC AUTO: 84.7 FL (ref 82–102)
NRBC # BLD: 0 K/UL (ref 0–0.2)
PLATELET # BLD AUTO: 283 K/UL (ref 150–450)
PMV BLD AUTO: 8.7 FL (ref 9.4–12.3)
POTASSIUM SERPL-SCNC: 4 MMOL/L (ref 3.5–5.1)
RBC # BLD AUTO: 3.34 M/UL (ref 4.23–5.6)
SARS-COV-2 RDRP RESP QL NAA+PROBE: NOT DETECTED
SERVICE CMNT-IMP: ABNORMAL
SERVICE CMNT-IMP: ABNORMAL
SODIUM SERPL-SCNC: 138 MMOL/L (ref 136–145)
SOURCE: NORMAL
VANCOMYCIN SERPL-MCNC: 11.9 UG/ML
WBC # BLD AUTO: 8.3 K/UL (ref 4.3–11.1)

## 2025-03-11 PROCEDURE — 2580000003 HC RX 258: Performed by: FAMILY MEDICINE

## 2025-03-11 PROCEDURE — 6360000002 HC RX W HCPCS: Performed by: ORTHOPAEDIC SURGERY

## 2025-03-11 PROCEDURE — 6360000002 HC RX W HCPCS: Performed by: FAMILY MEDICINE

## 2025-03-11 PROCEDURE — 2500000003 HC RX 250 WO HCPCS: Performed by: FAMILY MEDICINE

## 2025-03-11 PROCEDURE — 87635 SARS-COV-2 COVID-19 AMP PRB: CPT

## 2025-03-11 PROCEDURE — 36569 INSJ PICC 5 YR+ W/O IMAGING: CPT

## 2025-03-11 PROCEDURE — 02HV33Z INSERTION OF INFUSION DEVICE INTO SUPERIOR VENA CAVA, PERCUTANEOUS APPROACH: ICD-10-PCS | Performed by: ORTHOPAEDIC SURGERY

## 2025-03-11 PROCEDURE — C1751 CATH, INF, PER/CENT/MIDLINE: HCPCS

## 2025-03-11 PROCEDURE — 80202 ASSAY OF VANCOMYCIN: CPT

## 2025-03-11 PROCEDURE — 97530 THERAPEUTIC ACTIVITIES: CPT

## 2025-03-11 PROCEDURE — 36415 COLL VENOUS BLD VENIPUNCTURE: CPT

## 2025-03-11 PROCEDURE — 6370000000 HC RX 637 (ALT 250 FOR IP): Performed by: ORTHOPAEDIC SURGERY

## 2025-03-11 PROCEDURE — 83735 ASSAY OF MAGNESIUM: CPT

## 2025-03-11 PROCEDURE — 85027 COMPLETE CBC AUTOMATED: CPT

## 2025-03-11 PROCEDURE — 97535 SELF CARE MNGMENT TRAINING: CPT

## 2025-03-11 PROCEDURE — 6370000000 HC RX 637 (ALT 250 FOR IP): Performed by: FAMILY MEDICINE

## 2025-03-11 PROCEDURE — 82962 GLUCOSE BLOOD TEST: CPT

## 2025-03-11 PROCEDURE — 6370000000 HC RX 637 (ALT 250 FOR IP): Performed by: PHYSICIAN ASSISTANT

## 2025-03-11 PROCEDURE — 80048 BASIC METABOLIC PNL TOTAL CA: CPT

## 2025-03-11 RX ORDER — MAGNESIUM SULFATE IN WATER 40 MG/ML
2000 INJECTION, SOLUTION INTRAVENOUS ONCE
Status: COMPLETED | OUTPATIENT
Start: 2025-03-11 | End: 2025-03-11

## 2025-03-11 RX ADMIN — VANCOMYCIN HYDROCHLORIDE 1000 MG: 1 INJECTION, POWDER, LYOPHILIZED, FOR SOLUTION INTRAVENOUS at 11:42

## 2025-03-11 RX ADMIN — DULOXETINE HYDROCHLORIDE 30 MG: 30 CAPSULE, DELAYED RELEASE ORAL at 09:25

## 2025-03-11 RX ADMIN — WATER 1000 MG: 1 INJECTION INTRAMUSCULAR; INTRAVENOUS; SUBCUTANEOUS at 06:34

## 2025-03-11 RX ADMIN — HYDROMORPHONE HYDROCHLORIDE 2 MG: 2 TABLET ORAL at 09:25

## 2025-03-11 RX ADMIN — MAGNESIUM SULFATE HEPTAHYDRATE 2000 MG: 40 INJECTION, SOLUTION INTRAVENOUS at 11:42

## 2025-03-11 RX ADMIN — INSULIN LISPRO 2 UNITS: 100 INJECTION, SOLUTION INTRAVENOUS; SUBCUTANEOUS at 11:51

## 2025-03-11 RX ADMIN — INSULIN LISPRO 2 UNITS: 100 INJECTION, SOLUTION INTRAVENOUS; SUBCUTANEOUS at 09:25

## 2025-03-11 RX ADMIN — LISINOPRIL 40 MG: 20 TABLET ORAL at 09:24

## 2025-03-11 RX ADMIN — HYDROMORPHONE HYDROCHLORIDE 2 MG: 2 TABLET ORAL at 16:15

## 2025-03-11 RX ADMIN — WATER 1000 MG: 1 INJECTION INTRAMUSCULAR; INTRAVENOUS; SUBCUTANEOUS at 14:28

## 2025-03-11 RX ADMIN — ACETAMINOPHEN 500 MG: 500 TABLET, FILM COATED ORAL at 13:56

## 2025-03-11 RX ADMIN — INSULIN GLARGINE 15 UNITS: 100 INJECTION, SOLUTION SUBCUTANEOUS at 09:25

## 2025-03-11 ASSESSMENT — PAIN SCALES - WONG BAKER: WONGBAKER_NUMERICALRESPONSE: HURTS A LITTLE BIT

## 2025-03-11 ASSESSMENT — PAIN SCALES - GENERAL
PAINLEVEL_OUTOF10: 5
PAINLEVEL_OUTOF10: 3
PAINLEVEL_OUTOF10: 1
PAINLEVEL_OUTOF10: 3

## 2025-03-11 ASSESSMENT — PAIN DESCRIPTION - ORIENTATION: ORIENTATION: LEFT

## 2025-03-11 ASSESSMENT — PAIN DESCRIPTION - DESCRIPTORS: DESCRIPTORS: ACHING;SORE

## 2025-03-11 ASSESSMENT — PAIN DESCRIPTION - LOCATION: LOCATION: SHOULDER

## 2025-03-11 NOTE — PROGRESS NOTES
OCCUPATIONAL THERAPY Daily Note and AM      (Link to Caseload Tracking: OT Visit Days: 3  OT Orders   Time  OT Charge Capture  Rehab Caseload Tracker  Episode     Zack Treviño is a 69 y.o. 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]  Procedure(s) (LRB):  INCISION, DEBRIDEMENT, REMOVAL IMPLANT LEFT SHOULDER \"RTSA\" WITH A HUMERAL OSTEOTOMY  and INSERTION REMEDY ANTIBIOTIC CEMENT SPACER AND ANTIBIOTIC BEADS LEFT SHOULDER (Left)  5 Days Post-Op  Reason for Referral: Pain in Left Shoulder (M25.512)  Stiffness of Left Shoulder, Not elsewhere classified (M25.612)  Inpatient: Payor: [facility] MEDICARE / Plan: [employer] DUAL COMPLETE / Product Type: *No Product type* /     ASSESSMENT:     REHAB RECOMMENDATIONS:   Recommendation to date pending progress:  Setting:  Short-term Rehab    Equipment:    To Be Determined     ASSESSMENT:  Mr. Treviño is s/p INCISION, DEBRIDEMENT, REMOVAL IMPLANT LEFT SHOULDER \"RTSA\" WITH A HUMERAL OSTEOTOMY  and INSERTION REMEDY ANTIBIOTIC CEMENT SPACER AND ANTIBIOTIC BEADS LEFT SHOULDER (Left) . He presents with decreased independence with functional mobility and activities of daily living as compared to baseline level of function and safety. Patient would benefit from skilled Occupational Therapy to maximize independence and safety with self-care task and functional mobility.   Patient lives with his son who in/out of the house during the day. He had a previous CVA with L sided deficits- he has very minimal active movement of left UE. Patient is Solomon Islander speaking,  used for duration of session. Patient needed max assistance for donning gown and adjusting sling. He completed supine to sit with mod

## 2025-03-11 NOTE — PROGRESS NOTES
ACUTE PHYSICAL THERAPY GOALS:   (Developed with and agreed upon by patient and/or caregiver.)  GOALS (1-4 days):  (1.)  Patient will move from supine to sit and sit to supine  in bed with CONTACT GUARD ASSIST.    (2.)  Patient will transfer from bed to chair and chair to bed with CONTACT GUARD ASSIST using the least restrictive device.    (3.)  Patient will ambulate with CONTACT GUARD ASSIST for 25 feet with the least restrictive device.  (4.)  Patient will be independent with shoulder HEP to increase range of motion per MD orders.  ________________________________________________________________________________________________      PHYSICAL THERAPY: SHOULDER Daily Note and AM  (Link to Caseload Tracking: PT Visit Days : 5  Acknowledge Orders Time In/Out  PT Charge Capture  Rehab Caseload Tracker    Zack Treviño is a 69 y.o. 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]  Procedure(s) (LRB):  INCISION, DEBRIDEMENT, REMOVAL IMPLANT LEFT SHOULDER \"RTSA\" WITH A HUMERAL OSTEOTOMY  and INSERTION REMEDY ANTIBIOTIC CEMENT SPACER AND ANTIBIOTIC BEADS LEFT SHOULDER (Left)  5 Days Post-Op  Reason for Referral: Pain in Left Shoulder (M25.512)  Stiffness of Left Shoulder, Not elsewhere classified (M25.612)  Other abnormalities of gait and mobility (R26.89)  Hemiplegia and hemiparesis following cerebral infarction affecting left non-dominant side (I69.354)  Inpatient: Payor: [facility] MEDICARE / Plan: [employer] DUAL COMPLETE / Product Type: *No Product type* /     MOVEMENT PRECAUTIONS   TWICE A DAY   Elbow, hand, gentle pendulums left shoulder.   NO OTHER MOTION.   Nwb   Sling left shoulder      REHAB RECOMMENDATIONS:   Recommendation to

## 2025-03-11 NOTE — DISCHARGE SUMMARY
[facility] & [address]  [address]                            DISCHARGE SUMMARY      PATIENT NAME: YAHAIRA EVANS                : 1955  MED REC NO: [id number]                       ROOM: 324  ACCOUNT NO: [id number]                       ADMIT DATE: 2025  PROVIDER: Rajinder Castle Jr, MD    DISCHARGE DATE:  2025    ATTENDING PHYSICIAN:  RAJINDER CASTLE JR    ADMISSION DIAGNOSIS:  Periprosthetic infection, status post reverse left total shoulder arthroplasty.    DISCHARGE DIAGNOSIS:  Periprosthetic infection, status post reverse left total shoulder arthroplasty.    Please see H and P, operative summaries, and consults for details.    HOSPITAL COURSE:  The patient is a 69-year-old gentleman, who is now over 11 years status post a reverse left total shoulder arthroplasty.  The patient presented to the emergency room with a grossly infected left shoulder.  The patient was admitted on 2025, underwent an uncomplicated I and D, removal of implant of left shoulder, reverse left total shoulder arthroplasty with humeral osteotomy and insertion of Remedy cement spacer and antibiotic beads.  Immediately perioperatively, due to the patient's multiple medical issues, hospitalist consult was obtained.  Also due to his social circumstances, it was planned on sending the patient to rehab.  Immediate perioperatively, his magnesium was 1.5, it was repleted.  His hemoglobin was 10.8.  On postoperative day #1, hemoglobin was 9.1 and remainder of his labs were within normal limits.  Cultures were no growth to date.  On postoperative day #2, 2025, his magnesium was 1.5 and it was repleted.  Hemoglobin was 8.8.  On 2025, hemoglobin was 8.4.  He was hemodynamically stable.  Magnesium was down to 1.2 and was repleted.  Cultures were growing anaerobic gram-positive cocci and most likely Cutibacterium acnes.  ID consult had been previously  obtained.  On 03/10/2025, his magnesium was 1.3, hemoglobin was 8.6.  Culture results were still pending and rehab transfer was still pending.  He did receive a PICC line.  On 03/11/2025, Infectious Disease recommended IV vancomycin 1 g q.12 hours, end of treatment 04/17/2025.  His hemoglobin was 9.5 and his magnesium was 1.6.  It was repleted.  He was discharged to rehab on 03/11/2025. He will follow up in my office in 1 week.        RAJINDER CASTLE JR, MD      AGP/AQS  D:  03/11/2025 17:46:01  T:  03/11/2025 18:34:43  JOB #:  [id number]    CC:   Rajinder Castle Jr, MD

## 2025-03-11 NOTE — PROGRESS NOTES
Brief ID Progress Note    Chart reviewed. Pending STR.    A&P:    L prosthetic shoulder infection s/p implant removal/spacer placement on 3/6/25  - Staph capitis in one culture -- initial sens failed per Micro, so they are setting it up again. Anaerobic cx with GPR sent off for ID. Suspect the GPR is cutibacterium.  - I've deescalated to Vanc monotherapy and pt will discharge to rehab on this.    ID Abx Plan:  Diagnosis:  L shoulder PJI  Admit date:  3/6/2025    Admitting Physician: Rajinder Castle Jr., MD    Infectious Disease Physician:  Jourdan Giron MD    Assessment and Plan:  1) Vancomycin 1g IV every 12hrs with EOT 4/17/25.  2) Routine PICC Care  3) Labs:  Every Monday: CBC with Differential, Creatinine, LFTs and Vancomycin trough  Every Thursday: Creatinine and Vancomycin trough     Follow Up:  Follow-up with [facility] in 6 weeks.      Jourdan Giron MD

## 2025-03-11 NOTE — PROGRESS NOTES
TRANSFER - OUT REPORT:    Verbal report given to Receiving Nurse on Zack Hudson  being transferred to [facility] for routine progression of patient care       Report consisted of patient's Situation, Background, Assessment and   Recommendations(SBAR).     Information from the following report(s) Nurse Handoff Report, Adult Overview, Surgery Report, Intake/Output, and MAR was reviewed with the receiving nurse.           Lines:   PICC 03/11/25 Right Cephalic (Active)   Central Line Being Utilized No 03/11/25 1015   Criteria for Appropriate Use Long term IV/antibiotic administration 03/11/25 1015   Site Assessment Clean, dry & intact 03/11/25 1015   Phlebitis Assessment No symptoms 03/11/25 1015   Infiltration Assessment 0 03/11/25 1015   Extremity Circumference (cm) 30 cm 03/11/25 1015   External Catheter Length (cm) 1 cm 03/11/25 1015   Lumen #1 Color/Status Purple;Brisk blood return;Flushed;Alcohol cap applied 03/11/25 1015   Alcohol Cap Used Yes 03/11/25 1015   Date of Last Dressing Change 03/11/25 03/11/25 1015   Dressing Type Transparent w/CHG gel;Securing device 03/11/25 1015   Dressing Status Clean, dry & intact;New dressing applied 03/11/25 1015   Dressing Intervention New 03/11/25 1015        Opportunity for questions and clarification was provided.

## 2025-03-11 NOTE — PROGRESS NOTES
[facility]   Discharge Summary         Patient ID:  Zack Treviño  [id number]  69 y.o.  1955    Admit date: 3/6/2025  Discharge date and time:    Admitting Physician: Rajinder Castle Jr., MD  Surgeon: Same    Hospital Course    Admission Diagnoses: Pre op diagnosis: Prosthetic shoulder infection [T84.59XA, Z96.619]  Presence of left artificial shoulder joint [Z96.612]  Prior to surgery the patient was seen for consultation in the office or hospital and a complete history and physical was taken as it pertained to their condition.   Discharge Diagnoses: Prosthetic shoulder infection, initial encounter. Chronic and Acute medical problems addressed during this hospital stay by consulting physicians include:   They underwent Procedure(s) (LRB):  INCISION, DEBRIDEMENT, REMOVAL IMPLANT LEFT SHOULDER \"RTSA\" WITH A HUMERAL OSTEOTOMY  and INSERTION REMEDY ANTIBIOTIC CEMENT SPACER AND ANTIBIOTIC BEADS LEFT SHOULDER (Left) for this.       Principle Problem: Prosthetic shoulder infection, initial encounter.     Other Chronic and Acute Medical Issues: managed by the hospitalist during admission included: Principal Problem:    Prosthetic shoulder infection, initial encounter  Active Problems:    Prosthetic shoulder infection    Presence of left artificial shoulder joint    Infection of prosthetic total shoulder joint, initial encounter  Resolved Problems:    * No resolved hospital problems. *                               Perioperative Antibiotics:  Prior to surgery Ancef 1 to 2 mg was given depending on patient's weight and allergies.  If the patient was allergic to Ancef or MRSA positive  the patient was given Vancomycin and Cleocin        Hospital Medications:   Current Facility-Administered Medications   Medication Dose Route Frequency    magnesium sulfate 2000 mg in 50 mL IVPB premix  2,000 mg IntraVENous Once    vancomycin (VANCOCIN) 1,000 mg in sodium chloride 0.9 % 250 mL IVPB ([id number])  1,000 mg

## 2025-03-11 NOTE — PROGRESS NOTES
[facility]        March 11, 2025         Post Op day: 5 Days Post-Op Procedure(s) (LRB):  INCISION, DEBRIDEMENT, REMOVAL IMPLANT LEFT SHOULDER \"RTSA\" WITH A HUMERAL OSTEOTOMY  and INSERTION REMEDY ANTIBIOTIC CEMENT SPACER AND ANTIBIOTIC BEADS LEFT SHOULDER (Left)      Admit Date: 3/6/2025  Admit Diagnosis: Prosthetic shoulder infection [T84.59XA, Z96.619]  Presence of left artificial shoulder joint [Z96.612]  Prosthetic shoulder infection, initial encounter [T84.59XA, Z96.619]  Infection of prosthetic total shoulder joint, initial encounter [T84.59XA, Z96.619]       Principle Problem: Prosthetic shoulder infection, initial encounter.           Subjective: Doing well, No complaints, No SOB, No Chest Pain, No Nausea or Vomiting     Objective:   Vital Signs are Stable, No Acute Distress, Alert,  Dressing is Dry,  Neuro exam at baseline     Assessment / Plan :  Patient Active Problem List   Diagnosis    Hypomagnesemia    Hypertension    History of CVA (cerebrovascular accident)    Anemia associated with acute blood loss    Hypokalemia    Abdominal distension, gaseous    Arthritis of knee    Atherosclerosis of aorta    Chronic fatigue syndrome    Chronic pain    Chronic pain of right knee    Compression fracture of L1 lumbar vertebra (HCC)    Constipation, chronic    CVA, old, hemiparesis (HCC)    DDD (degenerative disc disease), lumbar    HNP (herniated nucleus pulposus), lumbar    Decreased testosterone level    Depressive disorder    Dysuria    Erectile dysfunction due to diseases classified elsewhere    Fatty liver    Frequent falls    Gait abnormality    GERD (gastroesophageal reflux disease)    H/O shoulder surgery    History of colon polyps    History of stroke with current residual effects    Hypercholesteremia    Hyperlipidemia    Hyperplastic polyp of sigmoid colon    Injury of head    Iron deficiency anemia    Left hemiparesis (HCC)    Lower extremity edema    Lumbago with sciatica    Lumbar  radiculopathy, chronic    Spondylolisthesis at L4-L5 level    Major depressive disorder, recurrent, moderate (HCC)    Memory loss    Myalgia    Nontoxic multinodular goiter    MORRIS on CPAP    Pityriasis versicolor    Primary localized osteoarthrosis of shoulder region    Rhinitis, chronic    Severe episode of recurrent major depressive disorder, with psychotic features (HCC)    Sleep disorder    Cervical stenosis of spine    Spinal stenosis at L4-L5 level    Testicular hypofunction    Testosterone deficiency    Type 2 diabetes mellitus without complication, without long-term current use of insulin (HCC)    Essential hypertension    Prosthetic shoulder infection    Presence of left artificial shoulder joint    Prosthetic shoulder infection, initial encounter    Infection of prosthetic total shoulder joint, initial encounter    No data found. Temp (24hrs), Av.3 °F (36.8 °C), Min:98.1 °F (36.7 °C), Max:98.4 °F (36.9 °C)    Body mass index is 27.5 kg/m².    Lab Results   Component Value Date/Time    HGB 8.6 03/10/2025 03:38 AM          S/P Procedure(s) (LRB):  INCISION, DEBRIDEMENT, REMOVAL IMPLANT LEFT SHOULDER \"RTSA\" WITH A HUMERAL OSTEOTOMY  and INSERTION REMEDY ANTIBIOTIC CEMENT SPACER AND ANTIBIOTIC BEADS LEFT SHOULDER (Left)      Continue PT/OT/Rehab  Cultures: Staph Capitis   Continue antibiotics per ID  DC dispo: [facility] when antibiotic recommendations per ID established   F/U as scheduled with Dr. Castle        Signed By: YENI Ventura  3/11/2025,  6:22 AM

## 2025-03-11 NOTE — PROGRESS NOTES
VANCO DAILY FOLLOW UP NOTE  [facility]   Pharmacy Pharmacokinetic Monitoring Service - Vancomycin    Consulting Provider: Dr Castle   Indication: Surgical Site Infection  Target Concentration: Goal AUC/MANUEL 400-600 mg*hr/L  Day of Therapy: 6  Additional Antimicrobials: none    Pertinent Laboratory Values:   Wt Readings from Last 1 Encounters:   03/06/25 77.2 kg (170 lb 4.8 oz)     Temp Readings from Last 1 Encounters:   03/11/25 98.6 °F (37 °C) (Oral)     Recent Labs     03/09/25  0623 03/10/25  0338 03/11/25  0725   BUN 16 14 13   CREATININE 0.67* 0.64* 0.73*   WBC 6.7 7.7 8.3     Estimated Creatinine Clearance: 95 mL/min (A) (based on SCr of 0.73 mg/dL (L)).    Lab Results   Component Value Date/Time    VANCORANDOM 11.9 03/11/2025 07:25 AM       MRSA Nasal Swab: N/A. Non-respiratory infection    Assessment:  Date/Time Dose Concentration AUC   3/8/25  0809 750 mg q12h 12.9 350   3/9/25  0723 1000 mg q12h 14.0 416   3/11/25 0725 1000 mg q12h 11.9 394   Note: Serum concentrations collected for AUC dosing may appear elevated if collected in close proximity to the dose administered, this is not necessarily an indication of toxicity    Plan:  Current dosing regimen is sub-therapeutic  Increase dose to 1250 mg q12h for predicted AUC/Tr of 492/13.2  Repeat vancomycin concentration ordered for 3/12 @ 0600    Pharmacy will continue to monitor patient and adjust therapy as indicated    Thank you for the consult,  Sherri Thomas RPH

## 2025-03-11 NOTE — CARE COORDINATION
CM Note:    Chart reviewed for updates. Auth approved with start date 3/11/2025 with next review date of 3/13/2025. Auth ID is [id number]. Jessica Weiss is the care coordinator. Fax number is [phone]. CM will continue to follow up with d/c planning.    MANGO Altman

## 2025-03-11 NOTE — CARE COORDINATION
CM note:    Chart reviewed for updates. Pt has a discharge order. Samina the liaison at [facility] has confirmed that pt has a bed available today. Pt is going to [place] [address] and number to call report is [phone]. Assigned RN has been notified to call report. Transport scheduled for 1630 pm. CM met with pt at bedside to discuss d/c planning. CM used the [employer] . Session code: 83180  ID: #[id number]  Name: Benjamín. Pt notified that he is discharging today to [facility]. He is agreeable with d/c planning. He was given the bed assignment and facility's phone number. He was also made aware that transport was scheduled for 1630 pm. IMM given in [place] and explained; signed copy scanned to medical records. CM has contacted pt's son Hung Treviño [phone] and gave him updates. He was given pt's room assignment at [facility] and notified that he will be leaving at 1630 pm. No questions/concerns presented by [facility]a Parish [facility] and will follow up with pt at rehab. Discharge packet completed. No further CM needs identified.            03/11/25 1319   Services At/After Discharge   Transition of Care Consult (CM Consult) SNF   Services At/After Discharge Skilled Nursing Facility (SNF)   [place] Resource Information Provided? No   Mode of Transport at Discharge [facility]   Hospital Transport Time of Discharge 1630   Confirm Follow Up Transport Other (see comment)  ([facility] transport scheuled at 1630)   Condition of Participation: Discharge Planning   The Plan for Transition of Care is related to the following treatment goals: Pt is discharging home with [facility]   The Patient and/or Patient Representative was provided with a Choice of Provider? Patient   The Patient and/Or Patient Representative agree with the Discharge Plan? Yes   Freedom of Choice list was provided with basic dialogue that supports the patient's individualized plan of care/goals,  treatment preferences, and shares the quality data associated with the providers?  Yes     MANGO Altman

## 2025-03-11 NOTE — CONSULTS
PICC Placement Note    PRE-PROCEDURE VERIFICATION    Correct Procedure: yes  Time out completed with assistant Sanju Moeller RN and all persons present in agreement with time out.  Risks and benefits reviewed with Patient and informed consent obtained prior to assessment and procedure.     Correct Site: yes  Temperature: Temp: 98.6 °F (37 °C), Temperature Source:    Recent Labs     03/11/25  0725   BUN 13      WBC 8.3     Allergies: Amlodipine, Atorvastatin, Carvedilol, Duloxetine, Niacin, and Pravastatin  Education materials for PICC Care given to patient or family.    PROCEDURE DETAIL  A single lumen PICC line was started for antibiotic therapy. The following documentation is in addition to the PICC properties in the lines/airways flowsheet:    Lidocaine used: Yes  Mid-Arm Circumference: 30 (cm)  Internal Catheter Length: 37 (cm)  External Catheter Length: 1 (cm)  Total Catheter Length: 38 (cm)  Vein Selection for PICC: right cephalic  Central Line Insertion Bundle followed: Yes  Complication Related to Insertion: none    Both the insertion guidewire and ECG guidewire were removed intact all ports have positive blood return and were flush well with normal saline.    The location of the tip of the PICC is verified using ECG technology.  The tip is in the SVC per ECG reading.  See image below.     Line is okay to use: yes      Sherry Tolentino RN

## 2025-03-11 NOTE — PROGRESS NOTES
Hospitalist Progress Note   Admit Date:  3/6/2025 10:39 AM   Name:  Zack Treviño   Age:  69 y.o.  Sex:  male  :  1955   MRN:  [id number]   Room:  [facility]/    Presenting/Chief Complaint: No chief complaint on file.     Reason(s) for Admission: Prosthetic shoulder infection [T84.59XA, Z96.619]  Presence of left artificial shoulder joint [Z96.612]  Prosthetic shoulder infection, initial encounter [T84.59XA, Z96.619]  Infection of prosthetic total shoulder joint, initial encounter [T84.59XA, Z96.619]     Hospital Course:   Please refer to the admission H&P for details of presentation.      In summary, Zack Treviño is a 69 y.o. male with medical history significant for DM2, prostatic left shoulder who was admitted to Ortho on 3/6 and underwent debridement and removal of left shoulder implant and insertion remedy of antibiotic cement spacer and antibiotic beads of left shoulder on 3/6.     Hospitalist was consulted postop for medical management.     Subjective/24 hr Events (25) :  Patient is seen and examined at bedside.      Laying in bed. Doing well.    Patient denies fever, chills, chest pains, shortness of breath, n/v, abdominal pain.  Tolerating diet and having BM.     Assessment & Plan:     Prosthetic Left shoulder infection, initial encounter  S/p debridement and removal of left shoulder implant and insertion remedy of antibiotic cement spacer and antibiotic beads of left shoulder on 3/6  - Management as per Primary Team: Orthopedics  - Pain management per Ortho  - PT/OT--STR  - ID following. Currently on Vancomycin/Ancef (3/6-...).  Operative cultures: Staph capitis     DM2  A1c of 7.5  - Hold home metformin  - FS glucose better controlled today after increased in lantus. Continue with lantus 15U qD  - Cont premeal Lispro 2UTID  - Diabetes management to assist     HTN  Continue home lisinopril     HLD  Continue home statin     EILEEN  Hemoglobin at baseline >11  Continue home ferrous sulfate  CTM

## 2025-03-12 NOTE — PROGRESS NOTES
Physician Progress Note      PATIENT:               YAHAIRA EVANS  CSN #:                  [id number]  :                       1955  ADMIT DATE:       3/6/2025 10:39 AM  DISCH DATE:        3/11/2025 5:00 PM  RESPONDING  PROVIDER #:        Lesvia Krishna MD          QUERY TEXT:    Pt admitted with infection of left prosthetic shoulder infection. Pt noted to   have WBC 13.9, CRP 12.1, HR up to 102, and RR up to 27. If possible, please   document in the progress notes and discharge summary if you are evaluating and   /or treating any of the following:    The medical record reflects the following:  Risk Factors: Left prosthetic shoulder infection  Clinical Indicators: presented with left prosthetic shoulder infection; on   arrival WBC 13.9, CRP 12.1, temp 98.6, HR , RR 13-27; shoulder culture   was positive for anaerobic gram positive rods  Treatment: Labs, imaging, monitoring, Vancomycin, ID consult  Options provided:  -- Sepsis, present on admission  -- Sepsis was ruled out  -- Other - I will add my own diagnosis  -- Disagree - Not applicable / Not valid  -- Disagree - Clinically unable to determine / Unknown  -- Refer to Clinical Documentation Reviewer    PROVIDER RESPONSE TEXT:    This patient has sepsis which was present on admission.    Query created by: Lana Dowd on 3/12/2025 8:28 AM      Electronically signed by:  Lesvia Krishna MD 3/12/2025 9:32 AM

## 2025-03-13 LAB
BACTERIA ISLT: ABNORMAL
BACTERIA ISLT: ABNORMAL
BACTERIA SPEC CULT: ABNORMAL
BACTERIA SPEC CULT: NORMAL
BACTERIA SPEC CULT: NORMAL
FUNGAL CULT/SMEAR: NORMAL
GRAM STN SPEC: ABNORMAL
GRAM STN SPEC: ABNORMAL
GRAM STN SPEC: NORMAL
SERVICE CMNT-IMP: ABNORMAL
SERVICE CMNT-IMP: NORMAL
SERVICE CMNT-IMP: NORMAL
SPECIMEN PROCESSING: NORMAL
SPECIMEN SOURCE: ABNORMAL
SPECIMEN SOURCE: NORMAL

## 2025-03-14 LAB
FUNGUS SMEAR: NORMAL
SPECIMEN SOURCE: NORMAL

## 2025-03-17 LAB
ANTIMICROBIAL SUSCEPTIBILITY: ABNORMAL
BACTERIA ISLT: ABNORMAL
BACTERIA ISLT: ABNORMAL
SPECIMEN SOURCE: ABNORMAL

## 2025-03-18 LAB
BACTERIA SPEC CULT: ABNORMAL
BACTERIA SPEC CULT: ABNORMAL
GRAM STN SPEC: ABNORMAL
GRAM STN SPEC: ABNORMAL
SERVICE CMNT-IMP: ABNORMAL

## 2025-03-21 LAB
BACTERIA SPEC CULT: ABNORMAL
BACTERIA SPEC CULT: ABNORMAL
Lab: NORMAL
Lab: NORMAL
REFERENCE LAB: NORMAL
SERVICE CMNT-IMP: ABNORMAL

## 2025-03-24 ENCOUNTER — OFFICE VISIT (OUTPATIENT)
Dept: ORTHOPEDIC SURGERY | Age: 70
End: 2025-03-24

## 2025-03-24 DIAGNOSIS — Z47.1 AFTERCARE FOLLOWING LEFT SHOULDER JOINT REPLACEMENT SURGERY: ICD-10-CM

## 2025-03-24 DIAGNOSIS — Z96.612 AFTERCARE FOLLOWING LEFT SHOULDER JOINT REPLACEMENT SURGERY: ICD-10-CM

## 2025-03-24 DIAGNOSIS — Z96.612 PRESENCE OF LEFT ARTIFICIAL SHOULDER JOINT: Primary | ICD-10-CM

## 2025-03-24 LAB
BACTERIA SPEC CULT: ABNORMAL
SERVICE CMNT-IMP: ABNORMAL
SERVICE CMNT-IMP: ABNORMAL

## 2025-03-24 PROCEDURE — 99024 POSTOP FOLLOW-UP VISIT: CPT | Performed by: ORTHOPAEDIC SURGERY

## 2025-03-24 NOTE — PROGRESS NOTES
Name: Zack Treviño  YOB: 1955  Gender: male  MRN: [id number]            HPI: Zack Treviño is a 69 y.o. right-hand-dominant male 3 weeks status post I&D removal implant left shoulder reverse left total shoulder arthroplasty with a humeral osteotomy and insertion of a remedy antibiotic cement spacer and antibiotic beads left shoulder for a periprosthetic left shoulder infection.  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  We did dissect out the radial nerve at the time of his surgery.  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  He is currently on IV vancomycin end of treatment is 4/17/2025.  He is currently in rehab      ROS/Meds/PSH/PMH/FH/SH: A ten system review of systems was performed and is negative other than what is in the HPI.   Tobacco:  reports that he quit smoking about 20 years ago. His smoking use included cigarettes. He started smoking about 54 years ago. He has a 34 pack-year smoking history. He has never used smokeless tobacco.  There were no vitals taken for this visit.     Physical Examination:  He is an awake alert gentleman sitting in a wheelchair  He has restricted range of cervical spine motion without radicular findings    The right shoulder has 0 to 160 degrees of active and 0 to 160 degrees passive forward elevation.   Internal rotation is to T6.  External rotation is to 60 degrees at the side.   In the 90 degree abducted position 90 degrees of external and 90 degrees internal rotation  The AC joint is non-tender  SC joint is non-tender.   Greater tuberosity is non-tender.  negative biceps  Negative O'Briens sign  negative lift-off sign  Negative belly press sign  Negative bear huggers sign  negative drop sign  negative hornblower's sign  No posterior glenohumeral joint line tenderness.   No evident excessive external rotation  Rotator cuff strength is 5/5.  negative external rotation stress test.   Negative empty can sign  There is no

## 2025-04-14 LAB
FUNGAL CULT/SMEAR: NORMAL
FUNGUS (MYCOLOGY) CULTURE: NORMAL
FUNGUS SMEAR: NORMAL
REFLEX TO ID: NORMAL
SPECIMEN PROCESSING: NORMAL
SPECIMEN SOURCE: NORMAL

## (undated) DEVICE — SYR 5ML 1/5 GRAD LL NSAF LF --

## (undated) DEVICE — SYRINGE MED 10ML LUERLOCK TIP W/O SFTY DISP

## (undated) DEVICE — DRAPE,U/SHT,SPLIT,FILM,60X84,STERILE: Brand: MEDLINE

## (undated) DEVICE — DRAPE,TOP,102X53,STERILE: Brand: MEDLINE

## (undated) DEVICE — GLOVE SURG SZ 6 THK91MIL LTX FREE SYN POLYISOPRENE ANTI

## (undated) DEVICE — PENCIL ES L3M BTTN SWCH HOLSTER W/ BLDE ELECTRD EDGE

## (undated) DEVICE — SUTURE PERMAHAND SZ 2-0 L12X18IN NONABSORBABLE BLK SILK A185H

## (undated) DEVICE — COAXIAL HIGH FLOW TIP WITH SOFT SHIELD

## (undated) DEVICE — ELECTRODE NDL 2.8IN COAT VALLEYLAB

## (undated) DEVICE — DRESSING,GAUZE,XEROFORM,CURAD,5"X9",ST: Brand: CURAD

## (undated) DEVICE — FLORASEAL MICROBIAL SEALANT: Brand: FLORASEAL

## (undated) DEVICE — CANNULA NSL ORAL AD FOR CAPNOFLEX CO2 O2 AIRLFE

## (undated) DEVICE — NDL PRT INJ NSAF BLNT 18GX1.5 --

## (undated) DEVICE — HANDPIECE SET WITH COAXIAL HIGH FLOW TIP AND SUCTION TUBE: Brand: INTERPULSE

## (undated) DEVICE — ARGYLE SIGMOID SURGICAL SUCTION INSTRUMENT 18 FR/CH (6.0 MM): Brand: ARGYLE

## (undated) DEVICE — TOTAL SHOULDER PACK: Brand: MEDLINE INDUSTRIES, INC.

## (undated) DEVICE — SPONGE GZ W4XL4IN COT 12 PLY TYP VII WVN C FLD DSGN STERILE

## (undated) DEVICE — BANDAGE,GAUZE,BULKEE II,4.5"X4.1YD,STRL: Brand: MEDLINE

## (undated) DEVICE — SOLUTION IRRIG 3000ML 0.9% SOD CHL USP UROMATIC PLAS CONT

## (undated) DEVICE — SUTURE FIBERWIRE SZ 5 L38IN NONABSORBABLE BLU L48MM 1/2 AR7211

## (undated) DEVICE — 3M™ STERI-DRAPE™ INSTRUMENT POUCH 1018: Brand: STERI-DRAPE™

## (undated) DEVICE — NEEDLE HYPO 18GA L1.5IN PNK S STL HUB POLYPR SHLD REG BVL

## (undated) DEVICE — DISPOSABLE DRAPE, STERILE, FOR A CDS-3060 5 FOOT TABLE: Brand: PEDIGO PRODUCTS, INC.

## (undated) DEVICE — SUTURE ABSORBABLE BRAIDED 0 CP-1 27 IN COAT UD VICRYL + VCP267H

## (undated) DEVICE — OSCILLATING TIP SAW CARTRIDGE: Brand: STRYKER PRECISION

## (undated) DEVICE — GLOVE SURG SZ 9 THK91MIL LTX FREE SYN POLYISOPRENE ANTI

## (undated) DEVICE — PAD,ABDOMINAL,5"X9",ST,LF,25/BX: Brand: MEDLINE INDUSTRIES, INC.

## (undated) DEVICE — SUTURE ETHILON SZ 2-0 L18IN NONABSORBABLE BLK L26MM PS 3/8 585H

## (undated) DEVICE — GLOVE SURG SZ 6 L12IN FNGR THK79MIL GRN LTX FREE

## (undated) DEVICE — STOCKINETTE,DOUBLE PLY,6X48,STERILE: Brand: MEDLINE

## (undated) DEVICE — SPONGE LAP W18XL18IN WHT COT 4 PLY FLD STRUNG RADPQ DISP ST 2 PER PACK

## (undated) DEVICE — STRIP,CLOSURE,WOUND,MEDI-STRIP,1/2X4: Brand: MEDLINE

## (undated) DEVICE — GARMENT,MEDLINE,DVT,INT,CALF,MED, GEN2: Brand: MEDLINE

## (undated) DEVICE — KENDALL RADIOLUCENT FOAM MONITORING ELECTRODE RECTANGULAR SHAPE: Brand: KENDALL

## (undated) DEVICE — SYR 3ML LL TIP 1/10ML GRAD --

## (undated) DEVICE — SUTURE FIBERWIRE SZ 2 W/ TAPERED NEEDLE BLUE L38IN NONABSORB BLU L26.5MM 1/2 CIRCLE AR7200

## (undated) DEVICE — SLING & SWATHE: Brand: DEROYAL

## (undated) DEVICE — 4.7MM ROUND BUR

## (undated) DEVICE — 1LYRTR 16FR10ML 100%SILI SNAP: Brand: MEDLINE INDUSTRIES, INC.

## (undated) DEVICE — CONNECTOR TBNG OD5-7MM O2 END DISP